# Patient Record
Sex: MALE | Race: AMERICAN INDIAN OR ALASKA NATIVE
[De-identification: names, ages, dates, MRNs, and addresses within clinical notes are randomized per-mention and may not be internally consistent; named-entity substitution may affect disease eponyms.]

---

## 2019-11-08 ENCOUNTER — HOSPITAL ENCOUNTER (INPATIENT)
Dept: HOSPITAL 5 - ED | Age: 44
LOS: 4 days | Discharge: HOME | DRG: 917 | End: 2019-11-12
Attending: INTERNAL MEDICINE | Admitting: INTERNAL MEDICINE
Payer: COMMERCIAL

## 2019-11-08 DIAGNOSIS — E87.2: ICD-10-CM

## 2019-11-08 DIAGNOSIS — T42.4X1A: Primary | ICD-10-CM

## 2019-11-08 DIAGNOSIS — R65.10: ICD-10-CM

## 2019-11-08 DIAGNOSIS — F19.20: ICD-10-CM

## 2019-11-08 DIAGNOSIS — J96.00: ICD-10-CM
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
## 2019-11-08 DIAGNOSIS — R74.0: ICD-10-CM

## 2019-11-08 DIAGNOSIS — G40.901: ICD-10-CM

## 2019-11-08 DIAGNOSIS — D72.829: ICD-10-CM

## 2019-11-08 LAB
ALBUMIN SERPL-MCNC: 4.3 G/DL (ref 3.9–5)
ALT SERPL-CCNC: 92 UNITS/L (ref 7–56)
BACTERIA #/AREA URNS HPF: (no result) /HPF
BASOPHILS # (AUTO): 0.1 K/MM3 (ref 0–0.1)
BASOPHILS NFR BLD AUTO: 0.5 % (ref 0–1.8)
BENZODIAZEPINES SCREEN,URINE: (no result)
BILIRUB UR QL STRIP: (no result)
BLOOD UR QL VISUAL: (no result)
BUN SERPL-MCNC: 16 MG/DL (ref 9–20)
BUN/CREAT SERPL: 12 %
CALCIUM SERPL-MCNC: 7.7 MG/DL (ref 8.4–10.2)
CALCIUM SERPL-MCNC: 8.2 MG/DL (ref 8.4–10.2)
EOSINOPHIL # BLD AUTO: 0 K/MM3 (ref 0–0.4)
EOSINOPHIL NFR BLD AUTO: 0.1 % (ref 0–4.3)
HCT VFR BLD CALC: 45.1 % (ref 35.5–45.6)
HEMOLYSIS INDEX: 48
HGB BLD-MCNC: 15.3 GM/DL (ref 11.8–15.2)
HYALINE CASTS #/AREA URNS LPF: 3 /LPF
LYMPHOCYTES # BLD AUTO: 1.1 K/MM3 (ref 1.2–5.4)
LYMPHOCYTES NFR BLD AUTO: 9 % (ref 13.4–35)
MCHC RBC AUTO-ENTMCNC: 34 % (ref 32–34)
MCV RBC AUTO: 89 FL (ref 84–94)
METHADONE SCREEN,URINE: (no result)
MONOCYTES # (AUTO): 1 K/MM3 (ref 0–0.8)
MONOCYTES % (AUTO): 8.1 % (ref 0–7.3)
MUCOUS THREADS #/AREA URNS HPF: (no result) /HPF
OPIATE SCREEN,URINE: (no result)
PH UR STRIP: 5 [PH] (ref 5–7)
PLATELET # BLD: 215 K/MM3 (ref 140–440)
RBC # BLD AUTO: 5.07 M/MM3 (ref 3.65–5.03)
RBC #/AREA URNS HPF: 1 /HPF (ref 0–6)
UROBILINOGEN UR-MCNC: < 2 MG/DL (ref ?–2)
WBC #/AREA URNS HPF: 4 /HPF (ref 0–6)

## 2019-11-08 PROCEDURE — 36600 WITHDRAWAL OF ARTERIAL BLOOD: CPT

## 2019-11-08 PROCEDURE — 71045 X-RAY EXAM CHEST 1 VIEW: CPT

## 2019-11-08 PROCEDURE — 82803 BLOOD GASES ANY COMBINATION: CPT

## 2019-11-08 PROCEDURE — 80074 ACUTE HEPATITIS PANEL: CPT

## 2019-11-08 PROCEDURE — 0BH17EZ INSERTION OF ENDOTRACHEAL AIRWAY INTO TRACHEA, VIA NATURAL OR ARTIFICIAL OPENING: ICD-10-PCS | Performed by: INTERNAL MEDICINE

## 2019-11-08 PROCEDURE — 85025 COMPLETE CBC W/AUTO DIFF WBC: CPT

## 2019-11-08 PROCEDURE — 80307 DRUG TEST PRSMV CHEM ANLYZR: CPT

## 2019-11-08 PROCEDURE — 36415 COLL VENOUS BLD VENIPUNCTURE: CPT

## 2019-11-08 PROCEDURE — 83735 ASSAY OF MAGNESIUM: CPT

## 2019-11-08 PROCEDURE — G0480 DRUG TEST DEF 1-7 CLASSES: HCPCS

## 2019-11-08 PROCEDURE — 82310 ASSAY OF CALCIUM: CPT

## 2019-11-08 PROCEDURE — 94002 VENT MGMT INPAT INIT DAY: CPT

## 2019-11-08 PROCEDURE — 80320 DRUG SCREEN QUANTALCOHOLS: CPT

## 2019-11-08 PROCEDURE — 74018 RADEX ABDOMEN 1 VIEW: CPT

## 2019-11-08 PROCEDURE — 82140 ASSAY OF AMMONIA: CPT

## 2019-11-08 PROCEDURE — 4A033R1 MEASUREMENT OF ARTERIAL SATURATION, PERIPHERAL, PERCUTANEOUS APPROACH: ICD-10-PCS | Performed by: INTERNAL MEDICINE

## 2019-11-08 PROCEDURE — 87205 SMEAR GRAM STAIN: CPT

## 2019-11-08 PROCEDURE — 93005 ELECTROCARDIOGRAM TRACING: CPT

## 2019-11-08 PROCEDURE — 81001 URINALYSIS AUTO W/SCOPE: CPT

## 2019-11-08 PROCEDURE — 80053 COMPREHEN METABOLIC PANEL: CPT

## 2019-11-08 PROCEDURE — 94003 VENT MGMT INPAT SUBQ DAY: CPT

## 2019-11-08 PROCEDURE — 94760 N-INVAS EAR/PLS OXIMETRY 1: CPT

## 2019-11-08 PROCEDURE — 31500 INSERT EMERGENCY AIRWAY: CPT

## 2019-11-08 PROCEDURE — 70450 CT HEAD/BRAIN W/O DYE: CPT

## 2019-11-08 PROCEDURE — 87070 CULTURE OTHR SPECIMN AEROBIC: CPT

## 2019-11-08 PROCEDURE — 94640 AIRWAY INHALATION TREATMENT: CPT

## 2019-11-08 PROCEDURE — 93010 ELECTROCARDIOGRAM REPORT: CPT

## 2019-11-08 PROCEDURE — 85027 COMPLETE CBC AUTOMATED: CPT

## 2019-11-08 PROCEDURE — 5A1945Z RESPIRATORY VENTILATION, 24-96 CONSECUTIVE HOURS: ICD-10-PCS | Performed by: INTERNAL MEDICINE

## 2019-11-08 PROCEDURE — 84100 ASSAY OF PHOSPHORUS: CPT

## 2019-11-08 RX ADMIN — PROPOFOL SCH MLS/HR: 10 INJECTION, EMULSION INTRAVENOUS at 23:30

## 2019-11-08 RX ADMIN — FAMOTIDINE SCH MG: 10 INJECTION, SOLUTION INTRAVENOUS at 23:39

## 2019-11-08 RX ADMIN — CEFTRIAXONE SODIUM SCH MLS/HR: 2 INJECTION, POWDER, FOR SOLUTION INTRAMUSCULAR; INTRAVENOUS at 23:40

## 2019-11-08 RX ADMIN — PROPOFOL SCH MLS/HR: 10 INJECTION, EMULSION INTRAVENOUS at 18:42

## 2019-11-08 RX ADMIN — ENOXAPARIN SODIUM SCH MG: 100 INJECTION SUBCUTANEOUS at 23:40

## 2019-11-08 RX ADMIN — Medication SCH ML: at 23:39

## 2019-11-08 NOTE — EMERGENCY DEPARTMENT REPORT
History of Present Illness





- General


Stated Complaint: OD/UNRESPONSIVE


Time Seen by Provider: 11/08/19 15:00


Source: EMS


Mode of arrival: Stretcher


Limitations: Altered Mental Status, Physical Limitation





- History of Present Illness


Initial Comments: 





Patient is a 42-year-old male that presents emergency room for overdose.  EMS 

states the patient has been unresponsive since the mother.  EMS states that the 

patient had a seizure just prior to arrival he was given 2 of Versed.  Prior to 

the seizure patient was given Narcan with no response.


MD Complaint: intentional overdose, accidental overdose


-: Sudden


Intent: other


Treatments Prior to Arrival: oxygen, narcan





- Related Data


                                  Previous Rx's











 Medication  Instructions  Recorded  Last Taken  Type


 


levETIRAcetam [Keppra TAB] 750 mg PO BID #60 tablet 11/12/19 Unknown Rx











                                    Allergies











Allergy/AdvReac Type Severity Reaction Status Date / Time


 


No Known Allergies Allergy   Unverified 11/08/19 17:15














ED Review of Systems


ROS: 


Stated complaint: OD/UNRESPONSIVE


Other details as noted in HPI





Comment: Unobtainable due to pts medical conditions





ED Past Medical Hx





- Past Medical History


Previous Medical History?: No





- Surgical History


Past Surgical History?: No





- Family History


Family history: no significant





- Social History


Smoking Status: Unknown if ever smoked


Substance Use Type: Cocaine





- Medications


Home Medications: 


                                Home Medications











 Medication  Instructions  Recorded  Confirmed  Last Taken  Type


 


levETIRAcetam [Keppra TAB] 750 mg PO BID #60 tablet 11/12/19  Unknown Rx














ED Physical Exam





- General


Limitations: Altered Mental Status, Physical Limitation


General appearance: obtunded





- Head


Head exam: Present: atraumatic, normocephalic





- Eye


Eye exam: Present: normal appearance, PERRL


Pupils: Present: normal accommodation





- ENT


ENT exam: Present: mucous membranes moist





- Neck


Neck exam: Present: normal inspection





- Respiratory


Respiratory exam: Present: normal lung sounds bilaterally.  Absent: respiratory 

distress, wheezes, rales





- Cardiovascular


Cardiovascular Exam: Present: regular rate, normal rhythm.  Absent: systolic 

murmur, diastolic murmur, rubs, gallop





- GI/Abdominal


GI/Abdominal exam: Present: soft, normal bowel sounds





- Rectal


Rectal exam: Present: deferred





- Extremities Exam


Extremities exam: Present: normal inspection





- Back Exam


Back exam: Present: normal inspection





- Neurological Exam


Neurological exam: Present: altered





- Expanded Neurological Exam


  ** Expanded


Best Eye Response ([place]): (1) no response


Best Motor Response ([place]): (1) no motor response


Best Verbal Response ([place]): (2) incomprehsible sounds


[place] Total: 4





- Skin


Skin exam: Present: warm, dry, intact, normal color.  Absent: rash





ED Course


                                   Vital Signs











  11/08/19 11/08/19 11/08/19





  15:11 15:16 15:28


 


Temperature   


 


Pulse Rate  93 H 92 H


 


Respiratory 15 11 L 





Rate   


 


Blood Pressure  95/53 95/53


 


Blood Pressure   





[Right]   


 


O2 Sat by Pulse 95 94 97





Oximetry   














  11/08/19 11/08/19 11/08/19





  15:30 15:46 16:00


 


Temperature 96.3 F L  


 


Pulse Rate 93 H 105 H 108 H


 


Respiratory 18 18 17





Rate   


 


Blood Pressure 95/53 95/53 138/81


 


Blood Pressure 122/71  





[Right]   


 


O2 Sat by Pulse 96 98 98





Oximetry   














  11/08/19 11/08/19 11/08/19





  16:16 16:30 16:45


 


Temperature   


 


Pulse Rate 106 H 99 H 99 H


 


Respiratory 20 21 21





Rate   


 


Blood Pressure 138/81 138/81 149/79


 


Blood Pressure   





[Right]   


 


O2 Sat by Pulse 98 98 90





Oximetry   














  11/08/19 11/08/19 11/08/19





  17:00 17:15 17:30


 


Temperature   


 


Pulse Rate 94 H 93 H 89


 


Respiratory 31 H 20 27 H





Rate   


 


Blood Pressure 128/81 128/71 133/68


 


Blood Pressure   





[Right]   


 


O2 Sat by Pulse 87 80 L 87





Oximetry   














  11/08/19 11/08/19 11/08/19





  17:46 18:00 18:03


 


Temperature   


 


Pulse Rate 88 82 88


 


Respiratory 24 26 H 





Rate   


 


Blood Pressure 110/61 103/58 103/58


 


Blood Pressure   





[Right]   


 


O2 Sat by Pulse 97  98





Oximetry   














  11/08/19 11/08/19 11/08/19





  18:16 18:30 18:45


 


Temperature   


 


Pulse Rate 91 H 92 H 84


 


Respiratory 36 H 20 23





Rate   


 


Blood Pressure 124/82 121/75 72/35


 


Blood Pressure   





[Right]   


 


O2 Sat by Pulse   





Oximetry   














  11/08/19 11/08/19 11/08/19





  19:12 19:16 19:30


 


Temperature   


 


Pulse Rate 77 77 89


 


Respiratory  29 H 34 H





Rate   


 


Blood Pressure 103/58 103/58 103/58


 


Blood Pressure   





[Right]   


 


O2 Sat by Pulse 94 88 92





Oximetry   














  11/08/19 11/08/19 11/08/19





  19:45 20:00 20:15


 


Temperature   


 


Pulse Rate 77 84 76


 


Respiratory 24 28 H 22





Rate   


 


Blood Pressure 95/52 72/35 96/57


 


Blood Pressure   





[Right]   


 


O2 Sat by Pulse 96 98 96





Oximetry   














  11/08/19 11/08/19 11/08/19





  20:30 20:46 21:30


 


Temperature   


 


Pulse Rate 78 89 96 H


 


Respiratory 19 27 H 25 H





Rate   


 


Blood Pressure 99/49 99/49 99/53


 


Blood Pressure   





[Right]   


 


O2 Sat by Pulse 99 98 98





Oximetry   














  11/08/19 11/08/19 11/08/19





  21:45 22:00 22:15


 


Temperature   


 


Pulse Rate 87 80 86


 


Respiratory 31 H 29 H 24





Rate   


 


Blood Pressure 105/50 92/53 92/62


 


Blood Pressure   





[Right]   


 


O2 Sat by Pulse 96 96 95





Oximetry   














  11/08/19 11/08/19 11/08/19





  22:30 22:45 23:00


 


Temperature   


 


Pulse Rate 77 82 86


 


Respiratory 24 24 26 H





Rate   


 


Blood Pressure 92/62 95/53 97/61


 


Blood Pressure   





[Right]   


 


O2 Sat by Pulse 96 95 97





Oximetry   














  11/08/19 11/08/19 11/08/19





  23:14 23:15 23:30


 


Temperature   


 


Pulse Rate 83 84 83


 


Respiratory 26 H 22 20





Rate   


 


Blood Pressure 97/61 109/63 92/57


 


Blood Pressure   





[Right]   


 


O2 Sat by Pulse 98 96 98





Oximetry   














  11/08/19 11/09/19 11/09/19





  23:45 00:00 00:31


 


Temperature   


 


Pulse Rate 90 83 90


 


Respiratory 19 21 25 H





Rate   


 


Blood Pressure 105/64 96/52 98/55


 


Blood Pressure   





[Right]   


 


O2 Sat by Pulse 96 96 97





Oximetry   














  11/09/19 11/09/19 11/09/19





  00:45 01:00 01:01


 


Temperature   


 


Pulse Rate 87 89 89


 


Respiratory 23 24 





Rate   


 


Blood Pressure 101/61 108/77 108/61


 


Blood Pressure   





[Right]   


 


O2 Sat by Pulse  95 95





Oximetry   














  11/09/19 11/09/19 11/09/19





  01:15 01:30 01:45


 


Temperature   


 


Pulse Rate 87 81 78


 


Respiratory 23 22 21





Rate   


 


Blood Pressure 95/53 95/52 92/53


 


Blood Pressure   





[Right]   


 


O2 Sat by Pulse 94  92





Oximetry   














  11/09/19 11/09/19 11/09/19





  02:00 02:15 02:30


 


Temperature   


 


Pulse Rate 78 79 77


 


Respiratory 19 19 20





Rate   


 


Blood Pressure 98/52 99/55 97/54


 


Blood Pressure   





[Right]   


 


O2 Sat by Pulse 93 93 94





Oximetry   














  11/09/19 11/09/19 11/09/19





  02:45 03:00 03:15


 


Temperature   


 


Pulse Rate 73 76 74


 


Respiratory 21 21 16





Rate   


 


Blood Pressure 94/50 98/53 100/56


 


Blood Pressure   





[Right]   


 


O2 Sat by Pulse 95  





Oximetry   














  11/09/19 11/09/19 11/09/19





  03:30 03:45 04:00


 


Temperature   


 


Pulse Rate 77 74 75


 


Respiratory 19 20 18





Rate   


 


Blood Pressure 100/53 95/52 96/53


 


Blood Pressure   





[Right]   


 


O2 Sat by Pulse  95 





Oximetry   














  11/09/19 11/09/19 11/09/19





  04:15 04:21 04:30


 


Temperature   


 


Pulse Rate 73 73 70


 


Respiratory 18  21





Rate   


 


Blood Pressure 92/52 92/52 97/61


 


Blood Pressure   





[Right]   


 


O2 Sat by Pulse  96 98





Oximetry   














  11/09/19 11/09/19 11/09/19





  04:45 05:00 05:15


 


Temperature   


 


Pulse Rate 71 76 78


 


Respiratory 19 20 14





Rate   


 


Blood Pressure 94/52 96/52 95/53


 


Blood Pressure   





[Right]   


 


O2 Sat by Pulse  97 97





Oximetry   














  11/09/19 11/09/19 11/09/19





  05:30 05:45 06:00


 


Temperature   


 


Pulse Rate 68 73 74


 


Respiratory 17 19 20





Rate   


 


Blood Pressure 92/54 94/49 90/45


 


Blood Pressure   





[Right]   


 


O2 Sat by Pulse   96





Oximetry   














  11/09/19 11/09/19 11/09/19





  06:15 06:45 07:00


 


Temperature   


 


Pulse Rate 71 72 70


 


Respiratory 19 21 17





Rate   


 


Blood Pressure 99/46 92/51 94/51


 


Blood Pressure   





[Right]   


 


O2 Sat by Pulse 95 96 





Oximetry   














  11/09/19 11/09/19 11/09/19





  07:10 07:15 07:28


 


Temperature 98.4 F  


 


Pulse Rate 72 69 73


 


Respiratory 20 16 





Rate   


 


Blood Pressure  94/52 94/52


 


Blood Pressure 94/55  





[Right]   


 


O2 Sat by Pulse 97 97 100





Oximetry   














  11/09/19 11/09/19 11/09/19





  07:30 07:45 08:00


 


Temperature   


 


Pulse Rate 75 73 73


 


Respiratory 15 20 17





Rate   


 


Blood Pressure 105/62 96/53 94/52


 


Blood Pressure   





[Right]   


 


O2 Sat by Pulse 100 98 





Oximetry   














  11/09/19 11/09/19 11/09/19





  08:15 08:30 08:45


 


Temperature   


 


Pulse Rate 74 71 69


 


Respiratory 20 20 20





Rate   


 


Blood Pressure 94/55 90/49 92/48


 


Blood Pressure   





[Right]   


 


O2 Sat by Pulse   97





Oximetry   














  11/09/19





  10:00


 


Temperature 


 


Pulse Rate 87


 


Respiratory 26 H





Rate 


 


Blood Pressure 


 


Blood Pressure 





[Right] 


 


O2 Sat by Pulse 97





Oximetry 














- Reevaluation(s)


Reevaluation #1: 


initial evaluation done.  Patient's GCS is less than 8. we will intubate patient

 to protect his airway.  See procedure note.


11/08/19 15:13





Reevaluation #2: 


Patient resting comfortably in bed and on ventilator.  Nurse to place Linda


11/08/19 16:09





Reevaluation #3: 


Mother at bedside.  And the mother states that the patient has multiple 

overdoses and has a long history of drug abuse.


11/08/19 17:19








- Consultations


Consultation #1: 


Hospitalist consult for admission.  Hospitalist admit patient.


11/08/19 18:39








- Intubation


Time Out Performed: Yes


Sedative: Etomidate


Paralytic: Rocuronium


Laryngoscope: fiberoptic video scope


Size: 4


Assist Device Used: fiberoptic device


ET Tube Size: 7.5


Tube Secured Depth (cm): 22


Tube Secured Location: teeth


Tube Placement Confirmation: visualized tube passing t, equal breath sounds 

bilat, no breath sounds over epi, confirmation by capnometr


Patient Tolerated Procedure: well, no complications


Intubation Complications: none





ED Medical Decision Making





- Lab Data


Result diagrams: 


                                 11/12/19 06:28





                                 11/12/19 06:28





- EKG Data


-: EKG Interpreted by Me


EKG shows normal: sinus rhythm, axis, intervals, QRS complexes, ST-T waves


Rate: normal





- Radiology Data


Radiology results: image reviewed


interpreted by me: 





ET tube in good placement.  No acute findings on chest x-ray..








 CHEST 1 VIEW 11/8/2019 3:26 PM 





INDICATION / CLINICAL INFORMATION: 


od. ams. intubation. 





COMPARISON: 


None available. 





FINDINGS: 





SUPPORT DEVICES: ET tube has tip 8 cm above [place]o. 





HEART / MEDIASTINUM: No significant abnormality. 





LUNGS / PLEURA: Elevation of left hemidiaphragm with volume loss in left 

hemithorax. No focal 


 infiltrate or pleural effusion. No pneumothorax. 





ADDITIONAL FINDINGS: No significant additional findings. 





IMPRESSION: 


1. Volume loss of left lung with elevated left hemidiaphragm. No acute disease. 

















 CT BRAIN: 11/8/2019 





 INDICATION / CLINICAL INFORMATION: 


 ams. 





 COMPARISON: 


 None available. 





 FINDINGS: 





 BRAIN/INTRACRANIAL STRUCTURES: Unenhanced CT images of the brain demonstrate no

 evidence of 


 intracranial abnormality. 





 Ventricles and sulci are normal in size and shape. 





 There is no evidence of ischemic injury, hemorrhage, or mass. There are no 

abnormal extra-axial 


 fluid collections. 














 EXTRACRANIAL STRUCTURES: Unremarkable. 











 Although only seen on the lateral  view of the skull, it appears that the 

nasogastric tube 


 may be coiled in the nasal cavity and possibly in the posterior oropharynx. 











 IMPRESSION: 


 Negative unenhanced MRI of the brain. 





 Possible coiling of nasogastric tube in nasopharynx and nasal cavity. 




















- Medical Decision Making





Patient is a 42-year-old male that presents emergency room with altered mental 

status and unresponsiveness.  Patient was immediately intubated to protect his 

airway due to his lack of responsiveness.  Patient found to have a metabolic 

acidosis.  Patient has a long history of drug abuse and overdose.  Patient's UDS

 is positive.  Patient's head CT negative.  Patient's labs essentially 

unremarkable except for his acidosis.





- Differential Diagnosis


unresponsiveness.  Overdose. AMS. 


Critical Care Time: Yes


Critical care time in (mins) excluding proc time.: 65


Critical care attestation.: 


If time is entered above; I have spent that time in minutes in the direct care 

of this critically ill patient, excluding procedure time.





Critical Care Time: 





65 minutes





ED Disposition


Clinical Impression: 


 Seizure, Unresponsive episode, Metabolic acidosis





Overdose


Qualifiers:


 Encounter type: initial encounter Injury intent: undetermined intent Qualified 

Code(s): T50.904A - Poisoning by unspecified drugs, medicaments and biological 

substances, undetermined, initial encounter





Altered mental state


Qualifiers:


 Altered mental status type: unspecified Qualified Code(s): R41.82 - Altered 

mental status, unspecified





Disposition: DC-09 OP ADMIT IP TO THIS HOSP


Is pt being admited?: Yes


Does the pt Need Aspirin: No


Condition: Critical


Time of Disposition: 18:42

## 2019-11-08 NOTE — XRAY REPORT
CHEST 1 VIEW 11/8/2019 3:26 PM



INDICATION / CLINICAL INFORMATION:

od. ams. intubation.



COMPARISON: 

None available.



FINDINGS:



SUPPORT DEVICES: ET tube has tip 8 cm above [place].



HEART / MEDIASTINUM: No significant abnormality. 



LUNGS / PLEURA: Elevation of left hemidiaphragm with volume loss in left hemithorax. No focal infiltr
ate or pleural effusion. No pneumothorax. 



ADDITIONAL FINDINGS: No significant additional findings.



IMPRESSION:

1. Volume loss of left lung with elevated left hemidiaphragm. No acute disease.



Signer Name: Krishan Moss MD 

Signed: 11/8/2019 3:51 PM

 Workstation Name: [id number]

## 2019-11-08 NOTE — CAT SCAN REPORT
CT BRAIN: 11/8/2019



INDICATION / CLINICAL INFORMATION:

ams.



COMPARISON: 

None available.



FINDINGS:



BRAIN/INTRACRANIAL STRUCTURES: Unenhanced CT images of the brain demonstrate no evidence of intracran
ial abnormality.



Ventricles and sulci are normal in size and shape.



There is no evidence of ischemic injury, hemorrhage, or mass. There are no abnormal extra-axial fluid
 collections.









EXTRACRANIAL STRUCTURES: Unremarkable.







 Although only seen on the lateral  view of the skull, it appears that the nasogastric tube may 
be coiled in the nasal cavity and possibly in the posterior oropharynx.



 



IMPRESSION:

 Negative unenhanced MRI of the brain.



Possible coiling of nasogastric tube in nasopharynx and nasal cavity.









All CT scans at this location are performed using dose reduction to ALARA by means of automated expos
ure control.



Signer Name: Navneet Patel MD 

Signed: 11/8/2019 7:42 PM

 Workstation Name: [id number]

## 2019-11-08 NOTE — HISTORY AND PHYSICAL REPORT
History of Present Illness


Date of examination: 11/08/19


Date of admission: 


11/08/2019


Chief complaint: 


Decreased responsiveness-unknown time





History of present illness: 


42-year-old  male with no significant past medical history except for 

PCP dependence and benzo dependence was found unresponsive by his mother in the 

morning.  Mother cannot tell how long he has passed out.  Last well-known time 

was last night.  Mother called EMS and [facility] patient had a tonic-clonic 

seizure just before arrival in the emergency room and was given 2 milligrams of 

Versed.  Patient's UDS positive for PCP and benzos.  No fever.  Patient was 

intubated in the emergency room.  Head CT was negative








Past Medical History


Previous Medical History?: No





Surgical History


Past Surgical History?: No





Family History


Family history: no significant PCP and benzo dependence 





Social History


PCP and benzo dependence











 Review of Systems 


ROS: 


Stated complaint: OD/UNRESPONSIVE


Other details as noted in HPI


Comment: Unobtainable due to pts medical conditions














Past History


Past Medical History: No medical history


Past Surgical History: No surgical history


Social history: lives with family, other (PCP abuse and benzo abuse)


Family history: no significant family history





Medications and Allergies


                                    Allergies











Allergy/AdvReac Type Severity Reaction Status Date / Time


 


No Known Allergies Allergy   Unverified 11/08/19 17:15











Active Meds: 


Active Medications





Lorazepam 100 mg/ Sodium Chloride/ Miscellaneous Information  100 mls @ 1 mls/hr

IV TITR CAM; Protocol


   Last Titration: 11/08/19 17:52 Dose:  5 mg/hr, 5 mls/hr


   Documented by: 


Propofol (Diprivan 10 Mg/Ml)  1,000 mg in 100 mls @ 2.245 mls/hr IV TITR CAM; 

Protocol


   Last Titration: 11/08/19 19:34 Dose:  10 mcg/kg/min, 4.491 mls/hr


   Documented by: 











Exam





- Constitutional


Vitals: 


                                        











Temp Pulse Resp BP Pulse Ox


 


 96.3 F L  77   29 H  103/58   88 


 


 11/08/19 15:30  11/08/19 19:16  11/08/19 19:16  11/08/19 19:16  11/08/19 19:16











General appearance: Present: no acute distress, well-nourished





- EENT


Eyes: Present: PERRL


ENT: hearing intact, clear oral mucosa





- Neck


Neck: Present: supple, normal ROM





- Respiratory


Respiratory effort: normal


Respiratory: bilateral: CTA





- Cardiovascular


Heart rate: 91


Rhythm: regular


Heart Sounds: Present: S1 & S2.  Absent: rub, click





- Extremities


Extremities: no ischemia, pulses intact, pulses symmetrical, No edema


Peripheral Pulses: within normal limits





- Abdominal


General gastrointestinal: Present: soft, non-tender, non-distended, normal bowel

sounds


Male genitourinary: Present: normal





- Rectal


Rectal Exam: deferred





- Integumentary


Integumentary: Present: clear, warm, dry





- Musculoskeletal


Musculoskeletal: generalized weakness





- Psychiatric


Psychiatric: other





- Neurologic


Neurologic: other (unresponsive, intubated on full ventilator  support)





- Allied Health


Allied health notes reviewed: nursing, case management





Results





- Labs


CBC & Chem 7: 


                                 11/08/19 15:30





                                 11/08/19 17:00


Labs: 


                             Laboratory Last Values











WBC  12.4 K/mm3 (4.5-11.0)  H  11/08/19  15:30    


 


RBC  5.07 M/mm3 (3.65-5.03)  H  11/08/19  15:30    


 


Hgb  15.3 gm/dl (11.8-15.2)  H  11/08/19  15:30    


 


Hct  45.1 % (35.5-45.6)   11/08/19  15:30    


 


MCV  89 fl (84-94)   11/08/19  15:30    


 


MCH  30 pg (28-32)   11/08/19  15:30    


 


MCHC  34 % (32-34)   11/08/19  15:30    


 


RDW  13.1 % (13.2-15.2)  L  11/08/19  15:30    


 


Plt Count  215 K/mm3 (140-440)   11/08/19  15:30    


 


Lymph % (Auto)  9.0 % (13.4-35.0)  L  11/08/19  15:30    


 


Mono % (Auto)  8.1 % (0.0-7.3)  H  11/08/19  15:30    


 


Eos % (Auto)  0.1 % (0.0-4.3)   11/08/19  15:30    


 


Baso % (Auto)  0.5 % (0.0-1.8)   11/08/19  15:30    


 


Lymph #  1.1 K/mm3 (1.2-5.4)  L  11/08/19  15:30    


 


Mono #  1.0 K/mm3 (0.0-0.8)  H  11/08/19  15:30    


 


Eos #  0.0 K/mm3 (0.0-0.4)   11/08/19  15:30    


 


Baso #  0.1 K/mm3 (0.0-0.1)   11/08/19  15:30    


 


Seg Neutrophils %  82.3 % (40.0-70.0)  H  11/08/19  15:30    


 


Seg Neutrophils #  10.2 K/mm3 (1.8-7.7)  H  11/08/19  15:30    


 


POC ABG pH  7.265  (7.35-7.45)  L  11/08/19  18:03    


 


POC ABG pCO2  45.0  (35-45)   11/08/19  18:03    


 


POC ABG pO2  212  ()  H  11/08/19  18:03    


 


POC ABG HCO3  20.4  (22-26 mml/L)   11/08/19  18:03    


 


POC ABG Total CO2  22  (23-27mmol/L)   11/08/19  18:03    


 


POC ABG O2 Sat  100   11/08/19  18:03    


 


POC ABG Base Excess  -7  ((-2) - (+3)mmol/L)   11/08/19  18:03    


 


FiO2  100 %  11/08/19  18:03    


 


Sodium  144 mmol/L (137-145)   11/08/19  17:00    


 


Potassium  4.0 mmol/L (3.6-5.0)   11/08/19  17:00    


 


Chloride  110.0 mmol/L ()  H  11/08/19  17:00    


 


Carbon Dioxide  17 mmol/L (22-30)  L  11/08/19  17:00    


 


Anion Gap  21 mmol/L  11/08/19  17:00    


 


BUN  16 mg/dL (9-20)   11/08/19  17:00    


 


Creatinine  1.3 mg/dL (0.8-1.5)   11/08/19  17:00    


 


Estimated GFR  > 60 ml/min  11/08/19  17:00    


 


BUN/Creatinine Ratio  12 %  11/08/19  17:00    


 


Glucose  111 mg/dL ()  H  11/08/19  17:00    


 


Lactic Acid  0.70 mmol/L (0.7-2.0)   11/08/19  17:00    


 


Calcium  8.2 mg/dL (8.4-10.2)  L  11/08/19  17:00    


 


Total Bilirubin  0.70 mg/dL (0.1-1.2)   11/08/19  17:00    


 


AST  367 units/L (5-40)  H  11/08/19  17:00    


 


ALT  92 units/L (7-56)  H  11/08/19  17:00    


 


Alkaline Phosphatase  55 units/L ()   11/08/19  17:00    


 


Total Protein  6.7 g/dL (6.3-8.2)   11/08/19  17:00    


 


Albumin  4.3 g/dL (3.9-5)   11/08/19  17:00    


 


Albumin/Globulin Ratio  1.8 %  11/08/19  17:00    


 


Urine Color  Yellow  (Yellow)   11/08/19  16:30    


 


Urine Turbidity  Slightly-cloudy  (Clear)   11/08/19  16:30    


 


Urine pH  5.0  (5.0-7.0)   11/08/19  16:30    


 


Ur Specific Gravity  1.017  (1.003-1.030)   11/08/19  16:30    


 


Urine Protein  100 mg/dl mg/dL (Negative)   11/08/19  16:30    


 


Urine Glucose (UA)  Neg mg/dL (Negative)   11/08/19  16:30    


 


Urine Ketones  Tr mg/dL (Negative)   11/08/19  16:30    


 


Urine Blood  Lg  (Negative)   11/08/19  16:30    


 


Urine Nitrite  Neg  (Negative)   11/08/19  16:30    


 


Urine Bilirubin  Neg  (Negative)   11/08/19  16:30    


 


Urine Urobilinogen  < 2.0 mg/dL (<2.0)   11/08/19  16:30    


 


Ur Leukocyte Esterase  Neg  (Negative)   11/08/19  16:30    


 


Urine WBC (Auto)  4.0 /HPF (0.0-6.0)   11/08/19  16:30    


 


Urine RBC (Auto)  1.0 /HPF (0.0-6.0)   11/08/19  16:30    


 


U Epithel Cells (Auto)  1.0 /HPF (0-13.0)   11/08/19  16:30    


 


Urine Bacteria (Auto)  2+ /HPF (Negative)   11/08/19  16:30    


 


Hyaline Casts  3 /LPF  11/08/19  16:30    


 


Urine Mucus  Few /HPF  11/08/19  16:30    


 


Urine Opiates Screen  Presumptive negative   11/08/19  16:30    


 


Urine Methadone Screen  Presumptive negative   11/08/19  16:30    


 


Ur Barbiturates Screen  Presumptive negative   11/08/19  16:30    


 


Ur Phencyclidine Scrn  Presumptive positive   11/08/19  16:30    


 


Ur Amphetamines Screen  Presumptive negative   11/08/19  16:30    


 


U Benzodiazepines Scrn  Presumptive positive   11/08/19  16:30    


 


Urine Cocaine Screen  Presumptive negative   11/08/19  16:30    


 


U Marijuana (THC) Screen  Presumptive negative   11/08/19  16:30    


 


Drugs of Abuse Note  Disclamer   11/08/19  16:30    


 


Plasma/Serum Alcohol  < 0.01 % (0-0.07)   11/08/19  18:34    








                                    Short CBC











  11/08/19 Range/Units





  15:30 


 


WBC  12.4 H  (4.5-11.0)  K/mm3


 


Hgb  15.3 H  (11.8-15.2)  gm/dl


 


Hct  45.1  (35.5-45.6)  %


 


Plt Count  215  (140-440)  K/mm3








                                       BMP











  11/08/19





  17:00


 


Sodium  144


 


Potassium  4.0


 


Chloride  110.0 H


 


Carbon Dioxide  17 L


 


BUN  16


 


Creatinine  1.3


 


Glucose  111 H


 


Calcium  8.2 L








                                 Liver Function











  11/08/19 Range/Units





  17:00 


 


Total Bilirubin  0.70  (0.1-1.2)  mg/dL


 


AST  367 H  (5-40)  units/L


 


ALT  92 H  (7-56)  units/L


 


Alkaline Phosphatase  55  ()  units/L


 


Albumin  4.3  (3.9-5)  g/dL








                                      Urine











  11/08/19 Range/Units





  16:30 


 


Urine Color  Yellow  (Yellow)  


 


Urine pH  5.0  (5.0-7.0)  


 


Ur Specific Gravity  1.017  (1.003-1.030)  


 


Urine Protein  100 mg/dl  (Negative)  mg/dL


 


Urine Glucose (UA)  Neg  (Negative)  mg/dL














- Imaging and Cardiology


EKG: report reviewed


Chest x-ray: report reviewed


CT Scan - head: report reviewed (no acute findings)


Imaging and Cardiology: 


Chest x-ray


IMPRESSION:


1. Volume loss of left lung with elevated left hemidiaphragm. No acute disease.











Assessment and Plan


Assessment and plan: 


Critical care time--40 minutes





Advance Directives: Yes (full code)


VTE prophylaxis?: Chemical


Plan of care discussed with patient/family: Yes





- Patient Problems


(1) Acute encephalopathy


Status: Acute   


Plan to address problem: 


Secondary to PCP and benzos


Pupils reactive


Ventilatory support


IV fluids


CIWA protocol for agitation and withdrawal symptoms








(2) PCP dependence


Status: Acute   


Plan to address problem: 


Supportive care


CIWA protocol


IV fluids








(3) Metabolic acidosis


Status: Acute   


Plan to address problem: 


IV normal saline for now








(4) Seizure


Status: Acute   


Plan to address problem: 


New-onset seizures


Patient initiated on IV Keppra


Patient may need oral Keppra for discharge


Patient to be bridged to oral Keppra once extubated








(5) Transaminitis


Status: Acute   


Plan to address problem: 


Acute hepatitis profile ordered


Trend liver function tests


IV fluids








(6) DVT prophylaxis


Status: Acute   


Plan to address problem: 


On Lovenox and GI prophylaxis

## 2019-11-09 LAB
HCO3 BLDA-SCNC: 17.4 MMOL/L (ref 20–26)
PCO2 BLDA: 36.5 MM HG
PH BLDA: 7.3 PH UNITS (ref 7.35–7.45)
PO2 BLDA: 89.8 MM HG (ref 80–90)

## 2019-11-09 RX ADMIN — ENOXAPARIN SODIUM SCH MG: 100 INJECTION SUBCUTANEOUS at 22:00

## 2019-11-09 RX ADMIN — LEVETIRACETAM SCH MLS/HR: 100 INJECTION, SOLUTION INTRAVENOUS at 18:35

## 2019-11-09 RX ADMIN — FAMOTIDINE SCH MG: 10 INJECTION, SOLUTION INTRAVENOUS at 22:00

## 2019-11-09 RX ADMIN — FAMOTIDINE SCH MG: 10 INJECTION, SOLUTION INTRAVENOUS at 11:44

## 2019-11-09 RX ADMIN — CEFTRIAXONE SODIUM SCH MLS/HR: 2 INJECTION, POWDER, FOR SOLUTION INTRAMUSCULAR; INTRAVENOUS at 22:00

## 2019-11-09 RX ADMIN — Medication SCH ML: at 22:00

## 2019-11-09 RX ADMIN — Medication SCH ML: at 11:44

## 2019-11-09 RX ADMIN — DEXTROSE AND SODIUM CHLORIDE SCH MLS/HR: 5; .9 INJECTION, SOLUTION INTRAVENOUS at 08:08

## 2019-11-09 RX ADMIN — DEXTROSE AND SODIUM CHLORIDE SCH MLS/HR: 5; .9 INJECTION, SOLUTION INTRAVENOUS at 18:35

## 2019-11-09 RX ADMIN — LEVETIRACETAM SCH MLS/HR: 100 INJECTION, SOLUTION INTRAVENOUS at 08:24

## 2019-11-09 NOTE — PROGRESS NOTE
Assessment and Plan


Assessment and plan: 


Patient is a 41 yo  man without known chronic medical problems except 

for PCP and Benzo dependence who presents to [facility] with AMS. He was found 

unresponsive by Mother.  Mother cannot tell how long he was passed out.  Last 

well-known time was last night. Mother called EMS and per EMS patient had a 

tonic-clonic seizure just before arrival in the emergency room and was given 2 

milligrams of Versed.  Patient's UDS positive for PCP and benzos.  Patient was 

intubated in the emergency room.  





* pCXR Impression: Volume loss of left lung with elevated left hemidiaphragm, no

  acute disease, ET tube has tip 8 cm above augusto


* CT head without contrast Impression: Negative 





(1) Acute toxic metabolic encephalopathy


Status: Acute   


Plan to address problem: 


Secondary to PCP and benzos


Pupils reactive


Ventilatory support


IV fluids


CIWA protocol for agitation and withdrawal symptoms





(2) PCP dependence


Status: Acute   


Plan to address problem: 


Supportive care


CIWA protocol


IV fluids





(3) Metabolic acidosis


Status: Acute   


Plan to address problem: 


IV normal saline for now





(4) Seizure


Status: Acute   


Plan to address problem: 


New-onset seizures


Patient initiated on IV Keppra


Patient may need oral Keppra for discharge


Patient to be bridged to oral Keppra once extubated





(5) Transaminitis


Status: Acute   


Plan to address problem: 


Acute hepatitis profile ordered


Trend liver function tests


IV fluids





(6) DVT prophylaxis


Status: Acute   


Plan to address problem: 


On Lovenox and GI prophylaxis








History


Interval history: 


Patient was seen and examined. Follow-up on current diagnosis of Respiratory 

failure. No overnight events reported to me. Patient intubated. Imaging, nursing

note, chart, labs and old chart reviewed. I spoke with mother and his Uncle Donnie

at bedside. 





Hospitalist Physical





- Physical exam


Narrative exam: 


Gen: WDWN, critically ill on life support/MV


HEENT: NCAT, EOMI, Pupils pinpoint, OP Clear 


Neck: supple, no adenopathy, no thyromegaly, no JVD 


CVS/Heart: RRR, normal S1S2, pulses present bilaterally 


Chest/Lungs: CTA B, Symmetrical chest expansion, good air entry bilaterally 


GI/Abdomen: soft, NTND, good bowel sounds, no guarding or rebound 


/Bladder: no suprapubic tenderness, no CVA or paraspinal tenderness 


Extermity/Skin: no c/c/e,


MSK: sedated


Neuro: sedated


Psych: sedated








- Constitutional


Vitals: 


                                        











Temp Pulse Resp BP Pulse Ox


 


 96.3 F L  72   21   92/51   96 


 


 11/08/19 15:30  11/09/19 06:45  11/09/19 06:45  11/09/19 06:45  11/09/19 06:45











General appearance: Present: no acute distress, well-nourished





Results





- Labs


CBC & Chem 7: 


                                 11/08/19 15:30





                                 11/08/19 17:00


Labs: 


                             Laboratory Last Values











WBC  12.4 K/mm3 (4.5-11.0)  H  11/08/19  15:30    


 


RBC  5.07 M/mm3 (3.65-5.03)  H  11/08/19  15:30    


 


Hgb  15.3 gm/dl (11.8-15.2)  H  11/08/19  15:30    


 


Hct  45.1 % (35.5-45.6)   11/08/19  15:30    


 


MCV  89 fl (84-94)   11/08/19  15:30    


 


MCH  30 pg (28-32)   11/08/19  15:30    


 


MCHC  34 % (32-34)   11/08/19  15:30    


 


RDW  13.1 % (13.2-15.2)  L  11/08/19  15:30    


 


Plt Count  215 K/mm3 (140-440)   11/08/19  15:30    


 


Lymph % (Auto)  9.0 % (13.4-35.0)  L  11/08/19  15:30    


 


Mono % (Auto)  8.1 % (0.0-7.3)  H  11/08/19  15:30    


 


Eos % (Auto)  0.1 % (0.0-4.3)   11/08/19  15:30    


 


Baso % (Auto)  0.5 % (0.0-1.8)   11/08/19  15:30    


 


Lymph #  1.1 K/mm3 (1.2-5.4)  L  11/08/19  15:30    


 


Mono #  1.0 K/mm3 (0.0-0.8)  H  11/08/19  15:30    


 


Eos #  0.0 K/mm3 (0.0-0.4)   11/08/19  15:30    


 


Baso #  0.1 K/mm3 (0.0-0.1)   11/08/19  15:30    


 


Seg Neutrophils %  82.3 % (40.0-70.0)  H  11/08/19  15:30    


 


Seg Neutrophils #  10.2 K/mm3 (1.8-7.7)  H  11/08/19  15:30    


 


POC ABG pH  7.300  (7.35-7.45)  L  11/08/19  22:12    


 


ABG pH  7.296 pH Units (7.350-7.450)  L  11/09/19  04:40    


 


POC ABG pCO2  36.6  (35-45)   11/08/19  22:12    


 


ABG pCO2  36.5 mm Hg  11/09/19  04:40    


 


POC ABG pO2  126  ()  H  11/08/19  22:12    


 


ABG pO2  89.8 mm Hg (80.0-90.0)   11/09/19  04:40    


 


POC ABG HCO3  18.0  (22-26 mml/L)   11/08/19  22:12    


 


ABG HCO3  17.4 mmol/L (20.0-26.0)  L  11/09/19  04:40    


 


POC ABG Total CO2  19  (23-27mmol/L)   11/08/19  22:12    


 


POC ABG O2 Sat  98   11/08/19  22:12    


 


ABG O2 Saturation  96.8 % (95.0-99.0)   11/09/19  04:40    


 


ABG O2 Content  18.2  (0.0-44)   11/09/19  04:40    


 


POC ABG Base Excess  -8  ((-2) - (+3)mmol/L)   11/08/19  22:12    


 


ABG Base Excess  -8.3 mmol/L (-2.0-3.0)  L  11/09/19  04:40    


 


ABG Hemoglobin  13.6 gm/dl (14.0-18.0)  L  11/09/19  04:40    


 


ABG Carboxyhemoglobin  1.4 % (0.0-5.0)   11/09/19  04:40    


 


ABG Methemoglobin  0.4 % (0.0-1.5)   11/09/19  04:40    


 


Oxyhemoglobin  95.1 % (95.0-99.0)   11/09/19  04:40    


 


FiO2  25 %  11/09/19  04:40    


 


Sodium  144 mmol/L (137-145)   11/08/19  17:00    


 


Potassium  4.0 mmol/L (3.6-5.0)   11/08/19  17:00    


 


Chloride  110.0 mmol/L ()  H  11/08/19  17:00    


 


Carbon Dioxide  17 mmol/L (22-30)  L  11/08/19  17:00    


 


Anion Gap  21 mmol/L  11/08/19  17:00    


 


BUN  16 mg/dL (9-20)   11/08/19  17:00    


 


Creatinine  1.3 mg/dL (0.8-1.5)   11/08/19  17:00    


 


Estimated GFR  > 60 ml/min  11/08/19  17:00    


 


BUN/Creatinine Ratio  12 %  11/08/19  17:00    


 


Glucose  111 mg/dL ()  H  11/08/19  17:00    


 


Lactic Acid  0.70 mmol/L (0.7-2.0)   11/08/19  17:00    


 


Calcium  7.7 mg/dL (8.4-10.2)  L  11/08/19  20:44    


 


Phosphorus  4.70 mg/dL (2.5-4.5)  H  11/08/19  20:44    


 


Magnesium  2.30 mg/dL (1.7-2.3)   11/08/19  20:44    


 


Total Bilirubin  0.70 mg/dL (0.1-1.2)   11/08/19  17:00    


 


AST  367 units/L (5-40)  H  11/08/19  17:00    


 


ALT  92 units/L (7-56)  H  11/08/19  17:00    


 


Alkaline Phosphatase  55 units/L ()   11/08/19  17:00    


 


Ammonia  55.0 umol/L (25-60)   11/08/19  20:44    


 


Total Protein  6.7 g/dL (6.3-8.2)   11/08/19  17:00    


 


Albumin  4.3 g/dL (3.9-5)   11/08/19  17:00    


 


Albumin/Globulin Ratio  1.8 %  11/08/19  17:00    


 


Urine Color  Yellow  (Yellow)   11/08/19  16:30    


 


Urine Turbidity  Slightly-cloudy  (Clear)   11/08/19  16:30    


 


Urine pH  5.0  (5.0-7.0)   11/08/19  16:30    


 


Ur Specific Gravity  1.017  (1.003-1.030)   11/08/19  16:30    


 


Urine Protein  100 mg/dl mg/dL (Negative)   11/08/19  16:30    


 


Urine Glucose (UA)  Neg mg/dL (Negative)   11/08/19  16:30    


 


Urine Ketones  Tr mg/dL (Negative)   11/08/19  16:30    


 


Urine Blood  Lg  (Negative)   11/08/19  16:30    


 


Urine Nitrite  Neg  (Negative)   11/08/19  16:30    


 


Urine Bilirubin  Neg  (Negative)   11/08/19  16:30    


 


Urine Urobilinogen  < 2.0 mg/dL (<2.0)   11/08/19  16:30    


 


Ur Leukocyte Esterase  Neg  (Negative)   11/08/19  16:30    


 


Urine WBC (Auto)  4.0 /HPF (0.0-6.0)   11/08/19  16:30    


 


Urine RBC (Auto)  1.0 /HPF (0.0-6.0)   11/08/19  16:30    


 


U Epithel Cells (Auto)  1.0 /HPF (0-13.0)   11/08/19  16:30    


 


Urine Bacteria (Auto)  2+ /HPF (Negative)   11/08/19  16:30    


 


Hyaline Casts  3 /LPF  11/08/19  16:30    


 


Urine Mucus  Few /HPF  11/08/19  16:30    


 


Urine Opiates Screen  Presumptive negative   11/08/19  16:30    


 


Urine Methadone Screen  Presumptive negative   11/08/19  16:30    


 


Ur Barbiturates Screen  Presumptive negative   11/08/19  16:30    


 


Ur Phencyclidine Scrn  Presumptive positive   11/08/19  16:30    


 


Ur Amphetamines Screen  Presumptive negative   11/08/19  16:30    


 


U Benzodiazepines Scrn  Presumptive positive   11/08/19  16:30    


 


Urine Cocaine Screen  Presumptive negative   11/08/19  16:30    


 


U Marijuana (THC) Screen  Presumptive negative   11/08/19  16:30    


 


Drugs of Abuse Note  Disclamer   11/08/19  16:30    


 


Plasma/Serum Alcohol  < 0.01 % (0-0.07)   11/08/19  18:34    


 


Hepatitis A IgM Ab  Non-reactive  (NonReactive)   11/08/19  20:44    


 


Hep Bs Antigen  Non-reactive  (Negative)   11/08/19  20:44    


 


Hep B Core IgM Ab  Non-reactive  (NonReactive)   11/08/19  20:44    


 


Hepatitis C Antibody  Non-reactive  (NonReactive)   11/08/19  20:44    














Active Medications





- Current Medications


Current Medications: 














Generic Name Dose Route Start Last Admin





  Trade Name Freq  PRN Reason Stop Dose Admin


 


Albuterol  2.5 mg  11/08/19 20:11 





  Proventil  IH  





  Q4HRT PRN  





  Shortness Of Breath  


 


Lipase/Protease/Amylase  1 each  11/08/19 20:05 





  Pancreaze  10,500 Unit  FEEDTUBE  





  PRN PRN  





  For Clogged Feeding Tube  


 


Dextrose  50 ml  11/08/19 20:05 





  D50w (25gm) Syringe  IV  





  Q30MIN PRN  





  Hypoglycemia  





  Protocol  


 


Enoxaparin Sodium  40 mg  11/08/19 21:00  11/08/19 23:40





  Enoxaparin  SUB-Q   40 mg





  QDAY@2200 CAM   Administration


 


Famotidine  20 mg  11/08/19 22:00  11/08/19 23:39





  Pepcid  IV   20 mg





  BID CAM   Administration


 


Fentanyl  50 mcg  11/09/19 00:38 





  Sublimaze  IV  





  Q10MIN PRN  





  ANALGESIA  


 


Hydromorphone HCl  0.5 mg  11/08/19 20:05 





  Dilaudid  IV  





  Q3H PRN  





  Pain , Severe (7-10)  


 


Hydrophilic Ointment  1 applic  11/09/19 00:38 





  Vaseline Lip Therapy  TP  





  Q2HR PRN  





  Dry Lips  


 


Lorazepam 100 mg/ Sodium  100 mls @ 1 mls/hr  11/08/19 16:00  11/08/19 17:52





Chloride/ Miscellaneous  IV   5 mg/hr





Information  TITR CAM   5 mls/hr





    Titration





  Protocol  





  1 MG/HR  


 


Propofol  1,000 mg in 100 mls @ 2.245 mls/hr  11/08/19 19:00  11/09/19 07:18





  Diprivan 10 Mg/Ml  IV   30 mcg/kg/min





  TITR CAM   13.472 mls/hr





    Titration





  Protocol  





  5 MCG/KG/MIN  


 


Dextrose/Sodium Chloride  1,000 mls @ 100 mls/hr  11/08/19 21:00 





  D5ns  IV  





  AS DIRECT CAM  


 


Levetiracetam 750 mg/ Dextrose  107.5 mls @ 400 mls/hr  11/09/19 06:00 





  IV  





  Q12H CAM  


 


Ceftriaxone Sodium  2 gm in 100 mls @ 200 mls/hr  11/08/19 21:00  11/08/19 23:40





  Rocephin/Ns 2 Gm/100 Ml  IV   200 mls/hr





  Q24HR@2200 CAM   Administration





  Protocol  


 


Fentanyl Citrate  2,000 mcg in 100 mls @ 3.742 mls/hr  11/09/19 01:00  11/09/19 

00:55





  Fentanyl Drip Premix  IV   1 mcg/kg/hr





  TITR CAM   3.742 mls/hr





    Administration





  Protocol  





  1 MCG/KG/HR  


 


Lorazepam  2 mg  11/08/19 20:38 





  Ativan  IV  





  Q1H PRN  





  CIWA-Ar 8-15  


 


Lorazepam  4 mg  11/08/19 20:38 





  Ativan  IV  





  Q15MIN PRN  





  CIWA-Ar >25  


 


Metoclopramide HCl  10 mg  11/08/19 20:05 





  Reglan  IV  





  Q6H PRN  





  Nausea And Vomiting  


 


Multi-Ingred Cream/Lotion/Oil/Oint  1 applic  11/09/19 00:38 





  Artificial Tears Ophth Oint  OU  





  Q4HR PRN  





  Dry Eye(s)  


 


Ondansetron HCl  4 mg  11/08/19 20:05 





  Zofran  IV  





  Q3H PRN  





  Nausea And Vomiting  


 


Simple Syrup  15 ml  11/08/19 20:05 





  Simple Syrup  FEEDTUBE  





  PRN PRN  





  Hypoglycemia  


 


Simple Syrup  30 ml  11/08/19 20:05 





  Simple Syrup  FEEDTUBE  





  PRN PRN  





  Hypoglycemia  


 


Sodium Bicarbonate  325 mg  11/08/19 20:05 





  Sodium Bicarbonate  FEEDTUBE  





  PRN PRN  





  For Clogged Feeding Tube  


 


Sodium Chloride  10 ml  11/08/19 22:00  11/08/19 23:39





  Sodium Chloride Flush Syringe 10 Ml  IV   10 ml





  BID CAM   Administration


 


Sodium Chloride  10 ml  11/08/19 20:05 





  Sodium Chloride Flush Syringe 10 Ml  IV  





  PRN PRN  





  LINE FLUSH

## 2019-11-09 NOTE — XRAY REPORT
CHEST 1 VIEW 



INDICATION / CLINICAL INFORMATION:

Verify OG tube placement.



COMPARISON: 

11/8/2019



FINDINGS:



SUPPORT DEVICES: ET tube is present and stable in position. There has been interval placement of NG t
ube. The tip is in the mid to distal portion of the stomach..



HEART / MEDIASTINUM: No significant abnormality. 



LUNGS / PLEURA: There is prominent left lower lobe pleural-parenchymal disease. Right lung is well ex
panded and clear. No interstitial pulmonary edema. No pneumothorax. 



ADDITIONAL FINDINGS: No significant additional findings.



IMPRESSION:

1. ET tube and NG tube appear to be in satisfactory position.

2. Prominent left lower lobe pleural-parenchymal disease.



Signer Name: Donna Arboleda MD 

Signed: 11/9/2019 5:49 AM

 Workstation Name: [id number]

## 2019-11-09 NOTE — CONSULTATION
History of Present Illness


Consult date: 11/09/19


Requesting physician: FLEX ARAIZA





Past History


Past Medical History: No medical history


Past Surgical History: No surgical history


Social history: lives with family, other (PCP abuse and benzo abuse)


Family history: no significant family history





Medications and Allergies


                                    Allergies











Allergy/AdvReac Type Severity Reaction Status Date / Time


 


No Known Allergies Allergy   Unverified 11/08/19 17:15











                                Home Medications











 Medication  Instructions  Recorded  Confirmed  Last Taken  Type


 


No Known Home Medications [No  11/09/19 11/09/19 Unknown History





Reported Home Medications]     











Active Meds: 


Active Medications





Albuterol (Proventil)  2.5 mg IH Q4HRT PRN


   PRN Reason: Shortness Of Breath


Lipase/Protease/Amylase (Pancreaze Dr 10,500 Unit)  1 each FEEDTUBE PRN PRN


   PRN Reason: For Clogged Feeding Tube


Dextrose (D50w (25gm) Syringe)  50 ml IV Q30MIN PRN; Protocol


   PRN Reason: Hypoglycemia


Enoxaparin Sodium (Enoxaparin)  40 mg SUB-Q QDAY@2200 [facility]


   Last Admin: 11/08/19 23:40 Dose:  40 mg


   Documented by: 


Famotidine (Pepcid)  20 mg IV BID [facility]


   Last Admin: 11/09/19 11:44 Dose:  20 mg


   Documented by: 


Fentanyl (Sublimaze)  50 mcg IV Q10MIN PRN


   PRN Reason: ANALGESIA


Hydromorphone HCl (Dilaudid)  0.5 mg IV Q3H PRN


   PRN Reason: Pain , Severe (7-10)


Hydrophilic Ointment (Vaseline Lip Therapy)  1 applic TP Q2HR PRN


   PRN Reason: Dry Lips


Lorazepam 100 mg/ Sodium Chloride/ Miscellaneous Information  100 mls @ 1 mls/hr

IV TITR CAM; Protocol


   Last Titration: 11/08/19 17:52 Dose:  5 mg/hr, 5 mls/hr


   Documented by: 


Propofol (Diprivan 10 Mg/Ml)  1,000 mg in 100 mls @ 2.245 mls/hr IV TITR CAM; 

Protocol


   Last Titration: 11/09/19 07:18 Dose:  30 mcg/kg/min, 13.472 mls/hr


   Documented by: 


Dextrose/Sodium Chloride (D5ns)  1,000 mls @ 100 mls/hr IV AS DIRECT CAM


   Last Admin: 11/09/19 08:08 Dose:  100 mls/hr


   Documented by: 


Levetiracetam 750 mg/ Dextrose  107.5 mls @ 400 mls/hr IV Q12H [facility]


   Last Admin: 11/09/19 08:24 Dose:  400 mls/hr


   Documented by: 


Ceftriaxone Sodium (Rocephin/Ns 2 Gm/100 Ml)  2 gm in 100 mls @ 200 mls/hr IV 

Q24HR@2200 CAM; Protocol


   Last Admin: 11/08/19 23:40 Dose:  200 mls/hr


   Documented by: 


Fentanyl Citrate (Fentanyl Drip Premix)  2,000 mcg in 100 mls @ 3.742 mls/hr IV 

TITR [facility]; Protocol


   Last Admin: 11/09/19 00:55 Dose:  1 mcg/kg/hr, 3.742 mls/hr


   Documented by: 


Lorazepam (Ativan)  2 mg IV Q1H PRN


   PRN Reason: CIWA-Ar 8-15


Lorazepam (Ativan)  4 mg IV Q15MIN PRN


   PRN Reason: CIWA-Ar >25


Metoclopramide HCl (Reglan)  10 mg IV Q6H PRN


   PRN Reason: Nausea And Vomiting


Multi-Ingred Cream/Lotion/Oil/Oint (Artificial Tears Ophth Oint)  1 applic OU 

Q4HR PRN


   PRN Reason: Dry Eye(s)


Ondansetron HCl (Zofran)  4 mg IV Q3H PRN


   PRN Reason: Nausea And Vomiting


Simple Syrup (Simple Syrup)  15 ml FEEDTUBE PRN PRN


   PRN Reason: Hypoglycemia


Simple Syrup (Simple Syrup)  30 ml FEEDTUBE PRN PRN


   PRN Reason: Hypoglycemia


Sodium Bicarbonate (Sodium Bicarbonate)  325 mg FEEDTUBE PRN PRN


   PRN Reason: For Clogged Feeding Tube


Sodium Chloride (Sodium Chloride Flush Syringe 10 Ml)  10 ml IV BID [facility]


   Last Admin: 11/09/19 11:44 Dose:  10 ml


   Documented by: 


Sodium Chloride (Sodium Chloride Flush Syringe 10 Ml)  10 ml IV PRN PRN


   PRN Reason: LINE FLUSH











Physical Examination


Vital signs: 


                                   Vital Signs











Resp Pulse Ox


 


 15   95 


 


 11/08/19 15:11  11/08/19 15:11














Results





- Laboratory Findings


CBC and BMP: 


                                 11/08/19 15:30





                                 11/08/19 17:00


ABG











POC ABG pH  7.300  (7.35-7.45)  L  11/08/19  22:12    


 


ABG pH  7.296 pH Units (7.350-7.450)  L  11/09/19  04:40    


 


POC ABG pCO2  36.6  (35-45)   11/08/19  22:12    


 


ABG pCO2  36.5 mm Hg  11/09/19  04:40    


 


POC ABG pO2  126  ()  H  11/08/19  22:12    


 


ABG pO2  89.8 mm Hg (80.0-90.0)   11/09/19  04:40    


 


POC ABG HCO3  18.0  (22-26 mml/L)   11/08/19  22:12    


 


POC ABG Total CO2  19  (23-27mmol/L)   11/08/19  22:12    


 


POC ABG O2 Sat  98   11/08/19  22:12    


 


ABG O2 Saturation  96.8 % (95.0-99.0)   11/09/19  04:40    








Abnormal lab findings: 


                                  Abnormal Labs











  11/08/19 11/08/19 11/08/19





  15:30 17:00 18:03


 


WBC  12.4 H  


 


RBC  5.07 H  


 


Hgb  15.3 H  


 


RDW  13.1 L  


 


Lymph % (Auto)  9.0 L  


 


Mono % (Auto)  8.1 H  


 


Lymph #  1.1 L  


 


Mono #  1.0 H  


 


Seg Neutrophils %  82.3 H  


 


Seg Neutrophils #  10.2 H  


 


POC ABG pH    7.265 L


 


ABG pH   


 


POC ABG pO2    212 H


 


ABG HCO3   


 


ABG Base Excess   


 


ABG Hemoglobin   


 


Chloride   110.0 H 


 


Carbon Dioxide   17 L 


 


Glucose   111 H 


 


Calcium   8.2 L 


 


Phosphorus   


 


AST   367 H 


 


ALT   92 H 














  11/08/19 11/08/19 11/09/19





  20:44 22:12 04:40


 


WBC   


 


RBC   


 


Hgb   


 


RDW   


 


Lymph % (Auto)   


 


Mono % (Auto)   


 


Lymph #   


 


Mono #   


 


Seg Neutrophils %   


 


Seg Neutrophils #   


 


POC ABG pH   7.300 L 


 


ABG pH    7.296 L


 


POC ABG pO2   126 H 


 


ABG HCO3    17.4 L


 


ABG Base Excess    -8.3 L


 


ABG Hemoglobin    13.6 L


 


Chloride   


 


Carbon Dioxide   


 


Glucose   


 


Calcium  7.7 L  


 


Phosphorus  4.70 H  


 


AST   


 


ALT   














Assessment and Plan





41 y/o male found unresponsive and now on sedation for possible seizure.





1.  Stop all sedation


2.  CXR is stable


3.  ABG shows metabolic acidosis but normal renal function, normal lactic acid, 

Blood sugar normal, did have positive UDS for phenyclidines (PCP cyril dust).  

could explain the seizure and the acidosis.  Chloride was also elevated as well.


4.  If patient wakes up and can follow commands, will extubate, no matter what 

time.





CCT 31 minutes.

## 2019-11-09 NOTE — XRAY REPORT
ABDOMEN 1 VIEW(S)



INDICATION / CLINICAL INFORMATION:

Abdominal pain.



COMPARISON: 

None available.



FINDINGS:



TUBES / LINES: NG tube terminates within the mid stomach.

BOWEL GAS PATTERN: No significant abnormality. 

FREE AIR / EXTRALUMINAL GAS: None seen.



ADDITIONAL FINDINGS: No significant additional findings.



IMPRESSION:

1. No significant abnormality.



Signer Name: Jhonatan Marsh MD 

Signed: 11/9/2019 3:41 PM

 Workstation Name: [id number]

## 2019-11-10 RX ADMIN — ENOXAPARIN SODIUM SCH MG: 100 INJECTION SUBCUTANEOUS at 21:37

## 2019-11-10 RX ADMIN — FAMOTIDINE SCH MG: 10 INJECTION, SOLUTION INTRAVENOUS at 21:37

## 2019-11-10 RX ADMIN — LEVETIRACETAM SCH MLS/HR: 100 INJECTION, SOLUTION INTRAVENOUS at 08:00

## 2019-11-10 RX ADMIN — Medication SCH ML: at 10:10

## 2019-11-10 RX ADMIN — FAMOTIDINE SCH MG: 10 INJECTION, SOLUTION INTRAVENOUS at 10:10

## 2019-11-10 RX ADMIN — CEFTRIAXONE SODIUM SCH MLS/HR: 2 INJECTION, POWDER, FOR SOLUTION INTRAMUSCULAR; INTRAVENOUS at 21:37

## 2019-11-10 RX ADMIN — LEVETIRACETAM SCH MLS/HR: 100 INJECTION, SOLUTION INTRAVENOUS at 19:43

## 2019-11-10 NOTE — PROGRESS NOTE
Assessment and Plan


Assessment and plan: 


Patient is a 41 yo  man without known chronic medical problems except 

for PCP and Benzo dependence who presents to [facility] with AMS. He was found 

unresponsive by Mother.  Mother cannot tell how long he was passed out.  Last 

well-known time was last night. Mother called EMS and per EMS patient had a 

tonic-clonic seizure just before arrival in the emergency room and was given 2 

milligrams of Versed.  Patient's UDS positive for PCP and benzos.  Patient was 

intubated in the emergency room.  





* pCXR Impression: Volume loss of left lung with elevated left hemidiaphragm, no

  acute disease, ET tube has tip 8 cm above augusto


* CT head without contrast Impression: Negative 





Acute respiratory failure s/p ETT, he self extubated on 11/09/19


Acute toxic metabolic encephalopathy with PCP and Bzo, still confused


PCP dependence


Metabolic acidosis


Status epilepticus Seizure: Patient initiated on IV Keppra


Transaminitis, most likely ETOH related


 DVT prophylaxis On Lovenox and GI prophylaxis








History


Interval history: 


Patient was seen and examined. Follow-up on current diagnosis of Respiratory 

failure. No overnight events reported to me. Patient intubated. Imaging, nursing

note, chart, labs and old chart reviewed. I spoke with mother and his Uncle Donnie

at bedside. 





Hospitalist Physical





- Physical exam


Narrative exam: 


Gen: WDWN, critically ill on life support/MV


HEENT: NCAT, EOMI, Pupils pinpoint, OP Clear 


Neck: supple, no adenopathy, no thyromegaly, no JVD 


CVS/Heart: RRR, normal S1S2, pulses present bilaterally 


Chest/Lungs: CTA B, Symmetrical chest expansion, good air entry bilaterally 


GI/Abdomen: soft, NTND, good bowel sounds, no guarding or rebound 


/Bladder: no suprapubic tenderness, no CVA or paraspinal tenderness 


Extermity/Skin: no c/c/e,


MSK: sedated


Neuro: sedated


Psych: sedated








- Constitutional


Vitals: 


                                        











Temp Pulse Resp BP Pulse Ox


 


 98.8 F   89   20   122/80   100 


 


 11/10/19 03:29  11/10/19 12:00  11/10/19 12:00  11/10/19 12:00  11/10/19 12:00











General appearance: Present: no acute distress, well-nourished





Results





- Labs


CBC & Chem 7: 


                                 11/08/19 15:30





                                 11/08/19 17:00


Labs: 


                             Laboratory Last Values











WBC  12.4 K/mm3 (4.5-11.0)  H  11/08/19  15:30    


 


RBC  5.07 M/mm3 (3.65-5.03)  H  11/08/19  15:30    


 


Hgb  15.3 gm/dl (11.8-15.2)  H  11/08/19  15:30    


 


Hct  45.1 % (35.5-45.6)   11/08/19  15:30    


 


MCV  89 fl (84-94)   11/08/19  15:30    


 


MCH  30 pg (28-32)   11/08/19  15:30    


 


MCHC  34 % (32-34)   11/08/19  15:30    


 


RDW  13.1 % (13.2-15.2)  L  11/08/19  15:30    


 


Plt Count  215 K/mm3 (140-440)   11/08/19  15:30    


 


Lymph % (Auto)  9.0 % (13.4-35.0)  L  11/08/19  15:30    


 


Mono % (Auto)  8.1 % (0.0-7.3)  H  11/08/19  15:30    


 


Eos % (Auto)  0.1 % (0.0-4.3)   11/08/19  15:30    


 


Baso % (Auto)  0.5 % (0.0-1.8)   11/08/19  15:30    


 


Lymph #  1.1 K/mm3 (1.2-5.4)  L  11/08/19  15:30    


 


Mono #  1.0 K/mm3 (0.0-0.8)  H  11/08/19  15:30    


 


Eos #  0.0 K/mm3 (0.0-0.4)   11/08/19  15:30    


 


Baso #  0.1 K/mm3 (0.0-0.1)   11/08/19  15:30    


 


Seg Neutrophils %  82.3 % (40.0-70.0)  H  11/08/19  15:30    


 


Seg Neutrophils #  10.2 K/mm3 (1.8-7.7)  H  11/08/19  15:30    


 


POC ABG pH  7.300  (7.35-7.45)  L  11/08/19  22:12    


 


ABG pH  7.296 pH Units (7.350-7.450)  L  11/09/19  04:40    


 


POC ABG pCO2  36.6  (35-45)   11/08/19  22:12    


 


ABG pCO2  36.5 mm Hg  11/09/19  04:40    


 


POC ABG pO2  126  ()  H  11/08/19  22:12    


 


ABG pO2  89.8 mm Hg (80.0-90.0)   11/09/19  04:40    


 


POC ABG HCO3  18.0  (22-26 mml/L)   11/08/19  22:12    


 


ABG HCO3  17.4 mmol/L (20.0-26.0)  L  11/09/19  04:40    


 


POC ABG Total CO2  19  (23-27mmol/L)   11/08/19  22:12    


 


POC ABG O2 Sat  98   11/08/19  22:12    


 


ABG O2 Saturation  96.8 % (95.0-99.0)   11/09/19  04:40    


 


ABG O2 Content  18.2  (0.0-44)   11/09/19  04:40    


 


POC ABG Base Excess  -8  ((-2) - (+3)mmol/L)   11/08/19  22:12    


 


ABG Base Excess  -8.3 mmol/L (-2.0-3.0)  L  11/09/19  04:40    


 


ABG Hemoglobin  13.6 gm/dl (14.0-18.0)  L  11/09/19  04:40    


 


ABG Carboxyhemoglobin  1.4 % (0.0-5.0)   11/09/19  04:40    


 


ABG Methemoglobin  0.4 % (0.0-1.5)   11/09/19  04:40    


 


Oxyhemoglobin  95.1 % (95.0-99.0)   11/09/19  04:40    


 


FiO2  25 %  11/09/19  04:40    


 


Sodium  144 mmol/L (137-145)   11/08/19  17:00    


 


Potassium  4.0 mmol/L (3.6-5.0)   11/08/19  17:00    


 


Chloride  110.0 mmol/L ()  H  11/08/19  17:00    


 


Carbon Dioxide  17 mmol/L (22-30)  L  11/08/19  17:00    


 


Anion Gap  21 mmol/L  11/08/19  17:00    


 


BUN  16 mg/dL (9-20)   11/08/19  17:00    


 


Creatinine  1.3 mg/dL (0.8-1.5)   11/08/19  17:00    


 


Estimated GFR  > 60 ml/min  11/08/19  17:00    


 


BUN/Creatinine Ratio  12 %  11/08/19  17:00    


 


Glucose  111 mg/dL ()  H  11/08/19  17:00    


 


Lactic Acid  0.70 mmol/L (0.7-2.0)   11/08/19  17:00    


 


Calcium  7.7 mg/dL (8.4-10.2)  L  11/08/19  20:44    


 


Phosphorus  4.70 mg/dL (2.5-4.5)  H  11/08/19  20:44    


 


Magnesium  2.30 mg/dL (1.7-2.3)   11/08/19  20:44    


 


Total Bilirubin  0.70 mg/dL (0.1-1.2)   11/08/19  17:00    


 


AST  367 units/L (5-40)  H  11/08/19  17:00    


 


ALT  92 units/L (7-56)  H  11/08/19  17:00    


 


Alkaline Phosphatase  55 units/L ()   11/08/19  17:00    


 


Ammonia  55.0 umol/L (25-60)   11/08/19  20:44    


 


Total Protein  6.7 g/dL (6.3-8.2)   11/08/19  17:00    


 


Albumin  4.3 g/dL (3.9-5)   11/08/19  17:00    


 


Albumin/Globulin Ratio  1.8 %  11/08/19  17:00    


 


Urine Color  Yellow  (Yellow)   11/08/19  16:30    


 


Urine Turbidity  Slightly-cloudy  (Clear)   11/08/19  16:30    


 


Urine pH  5.0  (5.0-7.0)   11/08/19  16:30    


 


Ur Specific Gravity  1.017  (1.003-1.030)   11/08/19  16:30    


 


Urine Protein  100 mg/dl mg/dL (Negative)   11/08/19  16:30    


 


Urine Glucose (UA)  Neg mg/dL (Negative)   11/08/19  16:30    


 


Urine Ketones  Tr mg/dL (Negative)   11/08/19  16:30    


 


Urine Blood  Lg  (Negative)   11/08/19  16:30    


 


Urine Nitrite  Neg  (Negative)   11/08/19  16:30    


 


Urine Bilirubin  Neg  (Negative)   11/08/19  16:30    


 


Urine Urobilinogen  < 2.0 mg/dL (<2.0)   11/08/19  16:30    


 


Ur Leukocyte Esterase  Neg  (Negative)   11/08/19  16:30    


 


Urine WBC (Auto)  4.0 /HPF (0.0-6.0)   11/08/19  16:30    


 


Urine RBC (Auto)  1.0 /HPF (0.0-6.0)   11/08/19  16:30    


 


U Epithel Cells (Auto)  1.0 /HPF (0-13.0)   11/08/19  16:30    


 


Urine Bacteria (Auto)  2+ /HPF (Negative)   11/08/19  16:30    


 


Hyaline Casts  3 /LPF  11/08/19  16:30    


 


Urine Mucus  Few /HPF  11/08/19  16:30    


 


Urine Opiates Screen  Presumptive negative   11/08/19  16:30    


 


Urine Methadone Screen  Presumptive negative   11/08/19  16:30    


 


Ur Barbiturates Screen  Presumptive negative   11/08/19  16:30    


 


Ur Phencyclidine Scrn  Presumptive positive   11/08/19  16:30    


 


Ur Amphetamines Screen  Presumptive negative   11/08/19  16:30    


 


U Benzodiazepines Scrn  Presumptive positive   11/08/19  16:30    


 


Urine Cocaine Screen  Presumptive negative   11/08/19  16:30    


 


U Marijuana (THC) Screen  Presumptive negative   11/08/19  16:30    


 


Drugs of Abuse Note  Disclamer   11/08/19  16:30    


 


Plasma/Serum Alcohol  < 0.01 % (0-0.07)   11/08/19  18:34    


 


Hepatitis A IgM Ab  Non-reactive  (NonReactive)   11/08/19  20:44    


 


Hep Bs Antigen  Non-reactive  (Negative)   11/08/19  20:44    


 


Hep B Core IgM Ab  Non-reactive  (NonReactive)   11/08/19  20:44    


 


Hepatitis C Antibody  Non-reactive  (NonReactive)   11/08/19  20:44    














Active Medications





- Current Medications


Current Medications: 














Generic Name Dose Route Start Last Admin





  Trade Name Freq  PRN Reason Stop Dose Admin


 


Albuterol  2.5 mg  11/08/19 20:11 





  Proventil  IH  





  Q4HRT PRN  





  Shortness Of Breath  


 


Lipase/Protease/Amylase  1 each  11/08/19 20:05 





  Pancremila Peralta 10,500 Unit  FEEDTUBE  





  PRN PRN  





  For Clogged Feeding Tube  


 


Dextrose  50 ml  11/08/19 20:05 





  D50w (25gm) Syringe  IV  





  Q30MIN PRN  





  Hypoglycemia  





  Protocol  


 


Enoxaparin Sodium  40 mg  11/08/19 21:00  11/09/19 22:00





  Enoxaparin  SUB-Q   40 mg





  QDAY@2200 CAM   Administration


 


Famotidine  20 mg  11/08/19 22:00  11/10/19 10:10





  Pepcid  IV   20 mg





  BID CAM   Administration


 


Hydrophilic Ointment  1 applic  11/09/19 00:38 





  Vaseline Lip Therapy  TP  





  Q2HR PRN  





  Dry Lips  


 


Dextrose/Sodium Chloride  1,000 mls @ 100 mls/hr  11/08/19 21:00  11/09/19 18:35





  D5ns  IV   100 mls/hr





  AS DIRECT CAM   Administration


 


Levetiracetam 750 mg/ Dextrose  107.5 mls @ 400 mls/hr  11/09/19 06:00  11/10/19

08:00





  IV   400 mls/hr





  Q12H CAM   Administration


 


Ceftriaxone Sodium  2 gm in 100 mls @ 200 mls/hr  11/08/19 21:00  11/09/19 22:00





  Rocephin/Ns 2 Gm/100 Ml  IV   200 mls/hr





  Q24HR@2200 CAM   Administration





  Protocol  


 


Metoclopramide HCl  10 mg  11/08/19 20:05 





  Reglan  IV  





  Q6H PRN  





  Nausea And Vomiting  


 


Multi-Ingred Cream/Lotion/Oil/Oint  1 applic  11/09/19 00:38 





  Artificial Tears Ophth Oint  OU  





  Q4HR PRN  





  Dry Eye(s)  


 


Ondansetron HCl  4 mg  11/08/19 20:05 





  Zofran  IV  





  Q3H PRN  





  Nausea And Vomiting  


 


Simple Syrup  15 ml  11/08/19 20:05 





  Simple Syrup  FEEDTUBE  





  PRN PRN  





  Hypoglycemia  


 


Simple Syrup  30 ml  11/08/19 20:05 





  Simple Syrup  FEEDTUBE  





  PRN PRN  





  Hypoglycemia  


 


Sodium Bicarbonate  325 mg  11/08/19 20:05 





  Sodium Bicarbonate  FEEDTUBE  





  PRN PRN  





  For Clogged Feeding Tube  


 


Sodium Chloride  10 ml  11/08/19 22:00  11/10/19 10:10





  Sodium Chloride Flush Syringe 10 Ml  IV   10 ml





  BID CAM   Administration


 


Sodium Chloride  10 ml  11/08/19 20:05 





  Sodium Chloride Flush Syringe 10 Ml  IV  





  PRN PRN  





  LINE FLUSH  














Nutrition/Malnutrition Assess





- Dietary Evaluation


Nutrition/Malnutrition Findings: 


                                        





Nutrition Notes                                            Start:  11/09/19 

14:43


Freq:                                                      Status: Active       




Protocol:                                                                       




 Document     11/09/19 14:43  RM  (Rec: 11/09/19 14:56  RM  [id number])


 Nutrition Notes


     Need for Assessment generated from:         MD Order


     Initial or Follow up                        Assessment


     Other Pertinent Diagnosis                   Acute encephalopathy, Seizure,


                                                 PCP dependence


     Current Diet                                TF (blank)


     Labs/Tests                                  


     Pertinent Medications                       Propofol at 13.5 ml/hr


     Height                                      5 ft 9 in


     Weight                                      74.843 kg


     Ideal Body Weight (kg)                      72.72


     BMI                                         24.3


     Subjective/Other Information                Consulted for TF


                                                 recommendation.


                                                 Pt on vent. Per nurse pt has


                                                 OG tube in place.


     Burn                                        Absent


     Trauma                                      Absent


     Minimum of two criteria                     No


     #1


      Nutrition Diagnosis                        Inadequate oral intake


      Etiology                                   on vent


      As Evidenced by Signs and Symptoms         TF consult


     Is patient on ventilator?                   Yes


     Is Patient Ambulatory and/or Out of Bed     No


     REE-([facility]-confined to bed)      [phone]


     Calculation Used for Recommendations        [facility]


     Additional Notes                            Protein Needs: 90-150g (1.2-2g


                                                 /kg)


                                                 Fluid Needs: 1 ml/kcal


 Nutrition Intervention


     Nutrition Support:                          Promote at 80 ml/hr


                                                 Water flush of 50 mls q 4 hrs


     Kcal                                        1,920


     Protein (gm)                                120


     Fluid (mL)                                  1,611


     Goal #1                                     TF tolerance


     Goal #2                                     Meet at least 80% of calorie


                                                 and protein needs via TF


     Anticipated Discharge Needs:                Unable to determine at this


                                                 time


     Follow-Up By:                               11/11/19


     Additional Comments                         Follow for new TF

## 2019-11-10 NOTE — XRAY REPORT
CHEST 1 VIEW 



INDICATION / CLINICAL INFORMATION:

follow up respiratory failure.



COMPARISON: 

11/9/2019



FINDINGS:



SUPPORT DEVICES: None.



HEART / MEDIASTINUM: No significant abnormality. 



LUNGS / PLEURA: There is airspace disease noted throughout the left lower lobe. This is much less con
solidated compared with the most recent chest radiograph. No significant pleural effusion. Right lung
 is clear. No pneumothorax. 



ADDITIONAL FINDINGS: No significant additional findings.



IMPRESSION:

1. Improving left lower lobe airspace disease.



Signer Name: Donna Arboleda MD 

Signed: 11/10/2019 4:16 AM

 Workstation Name: [id number]

## 2019-11-11 RX ADMIN — CEFTRIAXONE SODIUM SCH MLS/HR: 2 INJECTION, POWDER, FOR SOLUTION INTRAMUSCULAR; INTRAVENOUS at 21:38

## 2019-11-11 RX ADMIN — FAMOTIDINE SCH MG: 20 TABLET ORAL at 21:27

## 2019-11-11 RX ADMIN — LEVETIRACETAM SCH MLS/HR: 100 INJECTION, SOLUTION INTRAVENOUS at 17:18

## 2019-11-11 RX ADMIN — FAMOTIDINE SCH MG: 10 INJECTION, SOLUTION INTRAVENOUS at 09:47

## 2019-11-11 RX ADMIN — LEVETIRACETAM SCH MLS/HR: 100 INJECTION, SOLUTION INTRAVENOUS at 06:56

## 2019-11-11 RX ADMIN — ENOXAPARIN SODIUM SCH MG: 100 INJECTION SUBCUTANEOUS at 21:35

## 2019-11-11 NOTE — CONSULTATION
Past History


Past Medical History: No medical history


Past Surgical History: No surgical history


Social history: lives with family, other (PCP abuse and benzo abuse)


Family history: no significant family history





Medications and Allergies


                                    Allergies











Allergy/AdvReac Type Severity Reaction Status Date / Time


 


No Known Allergies Allergy   Unverified 11/08/19 17:15











                                Home Medications











 Medication  Instructions  Recorded  Confirmed  Last Taken  Type


 


No Known Home Medications [No  11/09/19 11/09/19 Unknown History





Reported Home Medications]     











Active Meds: 


Active Medications





Albuterol (Proventil)  2.5 mg IH Q4HRT PRN


   PRN Reason: Shortness Of Breath


Dextrose (D50w (25gm) Syringe)  50 ml IV Q30MIN PRN; Protocol


   PRN Reason: Hypoglycemia


Enoxaparin Sodium (Enoxaparin)  40 mg SUB-Q QDAY@2200 [facility]


   Last Admin: 11/10/19 21:37 Dose:  40 mg


   Documented by: 


Famotidine (Pepcid)  20 mg PO BID [facility]


Levetiracetam 750 mg/ Dextrose  107.5 mls @ 400 mls/hr IV Q12H [facility]


   Stop: 11/11/19 23:59


   Last Admin: 11/11/19 06:56 Dose:  400 mls/hr


   Documented by: 


Ceftriaxone Sodium (Rocephin/Ns 2 Gm/100 Ml)  2 gm in 100 mls @ 200 mls/hr IV 

Q24HR@2200 [facility]; Protocol


   Last Admin: 11/10/19 21:37 Dose:  200 mls/hr


   Documented by: 


Levetiracetam (Keppra)  750 mg PO BID [facility]


Ondansetron HCl (Zofran)  4 mg IV Q3H PRN


   PRN Reason: Nausea And Vomiting











Physical Examination





- Vital Signs


Vital Signs: 


                                   Vital Signs











Resp Pulse Ox


 


 15   95 


 


 11/08/19 15:11  11/08/19 15:11














Results





- Laboratory Findings


CBC and BMP: 


                                 11/08/19 15:30





                                 11/08/19 17:00


Abnormal Lab Findings: 


                                  Abnormal Labs











  11/08/19 11/08/19 11/08/19





  15:30 17:00 18:03


 


WBC  12.4 H  


 


RBC  5.07 H  


 


Hgb  15.3 H  


 


RDW  13.1 L  


 


Lymph % (Auto)  9.0 L  


 


Mono % (Auto)  8.1 H  


 


Lymph #  1.1 L  


 


Mono #  1.0 H  


 


Seg Neutrophils %  82.3 H  


 


Seg Neutrophils #  10.2 H  


 


POC ABG pH    7.265 L


 


ABG pH   


 


POC ABG pO2    212 H


 


ABG HCO3   


 


ABG Base Excess   


 


ABG Hemoglobin   


 


Chloride   110.0 H 


 


Carbon Dioxide   17 L 


 


Glucose   111 H 


 


Calcium   8.2 L 


 


Phosphorus   


 


AST   367 H 


 


ALT   92 H 














  11/08/19 11/08/19 11/09/19





  20:44 22:12 04:40


 


WBC   


 


RBC   


 


Hgb   


 


RDW   


 


Lymph % (Auto)   


 


Mono % (Auto)   


 


Lymph #   


 


Mono #   


 


Seg Neutrophils %   


 


Seg Neutrophils #   


 


POC ABG pH   7.300 L 


 


ABG pH    7.296 L


 


POC ABG pO2   126 H 


 


ABG HCO3    17.4 L


 


ABG Base Excess    -8.3 L


 


ABG Hemoglobin    13.6 L


 


Chloride   


 


Carbon Dioxide   


 


Glucose   


 


Calcium  7.7 L  


 


Phosphorus  4.70 H  


 


AST   


 


ALT   














Assessment and Plan





44 YEAR OLD MALE WITH HISTORY OF NO SIGNIFICANT MEDICAL PROBLEM AND WITH HISTORY

OF RECREATIONAL DRUG ABUSE 


WHO WAS ARRESTED THREE TIMES FOR DUI WAS ADMITTED ON11/8/2019 FOR BEING FOUND 

UNRESPONSIVE IN THE MORNING OF 11/8/2019.


PATIENT DEVELOPED AN EPISODE OF GTC SEIZURE ON WAY TO HOSPITAL.WORK UP SHOWED 

DRUG SCREEN BEING POSITIVE FOR BENZO AND PCP.


PATIENT ALSO HAD EVIDENCE OF INFECTION WITH ELEVATED WHITE BLOOD CELL COUNT. 

PATIENT WAS ADMITTED TO INTENSIVE CARE UNIT


AND INTUBATED FOR RESPIRATORY SUPPORT.CURRENTLY OFF VENTILATOR AND IS WAITING 

FOR FURTHER WORK UP AS BLOOD WAS FOUND 


IN HIS STOOL.





PHYSICAL EXAMINATION


IN NOACUTE DISTRESS, HE IS ALERT AND APPROPRIATE ,HAS INSIGHT INTO HIS CONDITION

AND ANSWERS QUESTIONS APPROPRIATELY.


HEART-NORMAL RATE AND RHYTHM.


CAROTIDS- BOTH PALPABLE,


CRANIAL NERVES- PUPILS REACT TO LIGHT AND ACCOMMODATION. EXTRA OCULAR MOVEMENT 

IS INTACT, NO FACIAL ASYMMERTY,


OTHER CRANIAL NERVES ARE WITH IN NORMAL LIMIT.


MOTOR- NORMAL STRENGTH IN ALL FOUR EXTREMITIES


REFLEXES- NORMAL WITHOUT ASYMMETRY AND BILATERAL DOWN GOING TOES.


COORDINATION- WITH IN NORMAL LIMIT.


SENSORY- GROSSLY WITH IN NORMAL LIMIT


GAIT- NORMAL 





IMPRESSION


1. ACUTE ENCEPHALOPATHY FROM DRUG OVERDOSE, RESOLVED NOW


2. SEIZURE , MOST LIKLEY PCP INDUCED,


3. ELEVATED WBC- ON ANTIBIOTICS





RECOMMEND.


1. PATIENT SHOULD CONTINUE KEPPRA UP UNTIL HE IS ENROLLED IN A DETOX REHAB 

PROGRAM


2. PATIENT WAS COUNSELED BY ME, BOTH THE MOTHER AND THE PATIEN, BOTH OF THEM


AGREED TO BE ENROLLED IN REHAB PROGRAM


3. SUBSTANCE ABUSE REHAB PROGRAM SHOULD BE CONTACTED AND PATIENT ENROLLED PRIOR 

TO DISCHARGE.

## 2019-11-12 VITALS — DIASTOLIC BLOOD PRESSURE: 82 MMHG | SYSTOLIC BLOOD PRESSURE: 117 MMHG

## 2019-11-12 LAB
ALBUMIN SERPL-MCNC: 3.2 G/DL (ref 3.9–5)
ALT SERPL-CCNC: 99 UNITS/L (ref 7–56)
BUN SERPL-MCNC: 7 MG/DL (ref 9–20)
BUN/CREAT SERPL: 10 %
CALCIUM SERPL-MCNC: 8.4 MG/DL (ref 8.4–10.2)
HCT VFR BLD CALC: 36.8 % (ref 35.5–45.6)
HEMOLYSIS INDEX: 4
HGB BLD-MCNC: 12.5 GM/DL (ref 11.8–15.2)
MCHC RBC AUTO-ENTMCNC: 34 % (ref 32–34)
MCV RBC AUTO: 90 FL (ref 84–94)
PLATELET # BLD: 176 K/MM3 (ref 140–440)
RBC # BLD AUTO: 4.12 M/MM3 (ref 3.65–5.03)

## 2019-11-12 RX ADMIN — FAMOTIDINE SCH MG: 20 TABLET ORAL at 09:56

## 2019-11-12 NOTE — CONSULTATION
History of Present Illness





- Reason for Consult


Consult date: 11/12/19


Reason for consult: Mental Health Evaluation


Requesting physician: HERBERT TESFAYE





- Chief Complaint


Chief complaint: 


"I used PCP"








- History of Present Psychiatric Illness


44 y.o. white male who presented to the ER unresponsive. Today the patient was 

calm and cooperative during the assessment. He stated that he was introduced to 

PCP recently and "probably used to much of it" prior to his ER visit. He stated 

that he has a hx of substance abuse and have used all types of drugs in the 

past. He stated, "Using drugs isn't a good thing." He was adamant that he wasn't

trying to kill himself. He stated that he is willing to follow up "ASAP" with 

rehab services once discharged. He denies being depressed when asked. He denies 

any past suicide attempts. He denies SI/HI's and AVH's. He denies a poor 

appetite and erratic sleep. He denies alcohol consumption (etoh). He stated that

he  buy Xanax on the street "sometimes."   





Per collateral information from the patient's mother Izzy Goldsmith who was at the 

bedside, she stated that her son have a hx of substance abuse. She does not 

believe that her son tried to kill himself prior to his arrival to the ER. She 

is the patient's support system when he is discharged. She stated that she 

support her son going to rehab services. 








Medications and Allergies


                                    Allergies











Allergy/AdvReac Type Severity Reaction Status Date / Time


 


No Known Allergies Allergy   Unverified 11/08/19 17:15











                                Home Medications











 Medication  Instructions  Recorded  Confirmed  Last Taken  Type


 


levETIRAcetam [Keppra TAB] 750 mg PO BID #60 tablet 11/12/19  Unknown Rx











Active Meds: 


Active Medications





Albuterol (Proventil)  2.5 mg IH Q4HRT PRN


   PRN Reason: Shortness Of Breath


Dextrose (D50w (25gm) Syringe)  50 ml IV Q30MIN PRN; Protocol


   PRN Reason: Hypoglycemia


Enoxaparin Sodium (Enoxaparin)  40 mg SUB-Q QDAY@2200 [facility]


   Last Admin: 11/11/19 21:35 Dose:  40 mg


   Documented by: 


Famotidine (Pepcid)  20 mg PO BID [facility]


   Last Admin: 11/12/19 09:56 Dose:  20 mg


   Documented by: 


Ceftriaxone Sodium (Rocephin/Ns 2 Gm/100 Ml)  2 gm in 100 mls @ 200 mls/hr IV 

Q24HR@2200 [facility]; Protocol


   Last Admin: 11/11/19 21:38 Dose:  200 mls/hr


   Documented by: 


Levetiracetam (Keppra)  750 mg PO BID [facility]


   Last Admin: 11/12/19 09:57 Dose:  750 mg


   Documented by: 


Ondansetron HCl (Zofran)  4 mg IV Q3H PRN


   PRN Reason: Nausea And Vomiting











Past psychiatric history





- Past Medical History


Past Medical History: No medical history


Past Surgical History: No surgical history





- past Psychiatric treatment and history


psychiatric treatment history: 


Hx of Substance Abuse. Denies a fam psy hx.








- Social History


Social history: lives with family





Mental Status Exam





- Vital signs


                                Last Vital Signs











Temp  98.1 F   11/12/19 06:38


 


Pulse  64   11/11/19 23:39


 


Resp  18   11/12/19 06:45


 


BP  117/82   11/12/19 06:38


 


Pulse Ox  94   11/12/19 06:45














- Exam


Narrative exam: 


MSE:





 Appearance: calm, cooperative  hospital attire  


 Behavior: regular eye contact 


 Speech: regular rate and tone 


 Mood:: "okay"


 Affect: congruent to mood 


 Thought Process: linera 


 Thought Content: denies SI/HI's and AVH's 


 Motor Activity: ambulatory


 Cognition: A/O x 2


 Insight: fair 


 Judgment: fair  





























Results


Result Diagrams: 


                                 11/12/19 06:28





                                 11/12/19 06:28


                              Abnormal lab results











  11/12/19 11/12/19 Range/Units





  06:28 06:28 


 


RDW  12.6 L   (13.2-15.2)  %


 


Potassium   3.4 L  (3.6-5.0)  mmol/L


 


Chloride   107.1 H  ()  mmol/L


 


BUN   7 L  (9-20)  mg/dL


 


Creatinine   0.7 L  (0.8-1.5)  mg/dL


 


AST   147 H  (5-40)  units/L


 


ALT   99 H  (7-56)  units/L


 


Total Protein   5.8 L  (6.3-8.2)  g/dL


 


Albumin   3.2 L  (3.9-5)  g/dL








All other labs normal.








Assessment and Plan


Assessment and plan: 


Impression: Unintentional Overdose. Substance Use DO (PCP). Today the patient 

was calm and cooperative during the assessment. The patient was positive for 

benzos. 





Recommendation/Plan: The patient does not meet 1013 criteria. Discussed the 

importance to abstain from recreational drug use with the patient, he verbalized

understanding. 





Dispo: The patient can follow up with [facility] or [facility] for outpatient

rehab services.  





Staffed with Dr. MARCUS Dietrich.

## 2019-11-12 NOTE — DISCHARGE SUMMARY
Providers





- Providers


Date of Admission: 


11/08/19 20:05





Attending physician: 


DEJAH SCHMITT MD





                                        





11/08/19 20:07


Consult to Dietitian/Nutrition [CONS] Routine 


   Physician Instructions: Assess nutrtn needs, initiate, modify, manage TF


   Reason For Exam: 


   Reason for Consult: Write/Manage Tube Feeding


   Reason for Consult: Write/Manage Tube Feeding





11/09/19 00:40


Consult to Dietitian/Nutrition [CONS] Routine 


   Physician Instructions: 


   Reason For Exam: 


   Reason for Consult: Evaluate nutritional intake





11/11/19 08:05


Consult to Physician [CONS] Routine 


   Comment: 


   Consulting Provider: JAZMÍN GOMEZ


   Physician Instructions: 


   Reason For Exam: E/M and treat for new onset seizure





11/11/19 11:37


Consult to Mental Health [CONS] Urgent 


   Reason For Exam: 1013 management


   Place consult to:: cns on call


   Notified:: awaiting call back


   Comment:: fax to [phone]











Primary care physician: 


PRIMARY CARE MD








Hospitalization


Reason for admission: overdose


Condition: Critical


Hospital course: 


Patient is a 43 yo  man without known chronic medical problems except 

for PCP and Benzo dependence who presents to [facility] with AMS. He was found 

unresponsive by Mother.  Mother cannot tell how long he was passed out.  Last 

well-known time was last night. Mother called EMS and per EMS patient had a 

tonic-clonic seizure just before arrival in the emergency room and was given 2 

milligrams of Versed.  Patient's UDS positive for PCP and benzos.  Patient was 

intubated in the emergency room.  


Other records includes DUI AND ARRESTED THREE TIMES 





* pCXR Impression: Volume loss of left lung with elevated left hemidiaphragm, no

   acute disease, ET tube has tip 8 cm above augusto


* CT head without contrast Impression: Negative 


* He was intially intubated and admitted to ICU and treated for respiratory 

  failure


* He has been doing well clinically since extubation


* Per documentation it appears the patient "Patient is not on 1013, i was told 

  incorrectly, he denies ever having SI, HI. "


* He was seen by Neurology with recommendation for Keppra and also Detox Program


* Phsych saw and evaluated the patient with the following recommendations


* Patient is Medically stable for discharge and follow [facility] Psych recommendations


* I informed patient his mother will like to speak to me, he will only give 

  permission after his discussion with psych, CM informed. 


* Stongly encouraged patient to consider inpatient detox program





Acute respiratory failure s/p ETT, he self extubated on 11/09/19


Acute toxic metabolic encephalopathy with PCP and Bzo, still confused


PCP dependence


Metabolic acidosis


SIRS, poa, non infectious elevation of WBC on empiric abc, no source of 

infection identified


Status epilepticus Seizure: Patient initiated on IV Keppra


Transaminitis, most likely ETOH related


 D


Disposition: DC-01 TO HOME OR SELFCARE


Time spent for discharge: 35 mins





Core Measure Documentation





- Palliative Care


Palliative Care/ Comfort Measures: Not Applicable





- Core Measures


Any of the following diagnoses?: none





Exam





- Physical Exam


Narrative exam: 


VITAL SIGNS:  Reviewed.    


GENERAL:  The patient appears normally developed, Vital signs as documented.


HEAD:  No signs of head trauma.


EYES:  Pupils are equal.  Extraocular motions intact.  


EARS:  Hearing grossly intact.


MOUTH:  Oropharynx is normal. 


NECK:  No adenopathy, no JVD.  


CHEST:  Chest with clear breath sounds bilaterally.  No wheezes, rales, or 

rhonchi.  


CARDIAC:  Regular rate and rhythm.  S1 and S2, without murmurs, gallops, or 

rubs.


VASCULAR:  No Edema.  Peripheral pulses normal and equal in all extremities.


ABDOMEN:  Soft, non tender and non distended.  No   rebound or guarding, and no 

masses palpated.   Bowel Sounds normal.


MUSCULOSKELETAL:  Good range of motion of all major joints. Extremities without 

clubbing, cyanosis or edema.  


NEUROLOGIC EXAM:  Alert and oriented x 3   No focal sensory or strength 

deficits.   Speech normal.  Follows commands.


PSYCHIATRIC:  Mood normal.


SKIN:  detial exam as documented in skin assessment





- Constitutional


Vitals: 


                                        











Temp Pulse Resp BP Pulse Ox


 


 98.1 F   64   18   117/82   94 


 


 11/12/19 06:38  11/11/19 23:39  11/12/19 06:45  11/12/19 06:38  11/12/19 06:45














Plan


Activity: no driving until cleared by PCP, fall precautions


Diet: low fat


Special Instructions: record daily BP diary, smoking cessation, follow up in 

rehab


Care Plan Goals: 


Recovery from substance abuse


Plan of Treatment: 


Enroll in Detox program


Health Concerns: 


complications of continues substance abuse or seizure due to non compliance


Follow up with: 


[facility] [Outside] - 7 Days


ROBERT SALDAÑA MD [Staff Physician] - 7 Days


PRIMARY CARE,MD [Primary Care Provider] - 7 Days


TERRI TEJADA MD [Staff Physician] - 7 Days

## 2019-11-17 ENCOUNTER — HOSPITAL ENCOUNTER (EMERGENCY)
Dept: HOSPITAL 5 - ED | Age: 44
Discharge: HOME | End: 2019-11-17
Payer: SELF-PAY

## 2019-11-17 VITALS — SYSTOLIC BLOOD PRESSURE: 124 MMHG | DIASTOLIC BLOOD PRESSURE: 75 MMHG

## 2019-11-17 DIAGNOSIS — F16.10: ICD-10-CM

## 2019-11-17 DIAGNOSIS — Z79.899: ICD-10-CM

## 2019-11-17 DIAGNOSIS — R41.82: Primary | ICD-10-CM

## 2019-11-17 DIAGNOSIS — J45.909: ICD-10-CM

## 2019-11-17 LAB
BASOPHILS # (AUTO): 0.1 K/MM3 (ref 0–0.1)
BASOPHILS NFR BLD AUTO: 0.7 % (ref 0–1.8)
BENZODIAZEPINES SCREEN,URINE: (no result)
BILIRUB UR QL STRIP: (no result)
BLOOD UR QL VISUAL: (no result)
BUN SERPL-MCNC: 18 MG/DL (ref 9–20)
BUN/CREAT SERPL: 14 %
CALCIUM SERPL-MCNC: 9.4 MG/DL (ref 8.4–10.2)
EOSINOPHIL # BLD AUTO: 0.2 K/MM3 (ref 0–0.4)
EOSINOPHIL NFR BLD AUTO: 1.8 % (ref 0–4.3)
HCT VFR BLD CALC: 47.6 % (ref 35.5–45.6)
HEMOLYSIS INDEX: 13
HGB BLD-MCNC: 16 GM/DL (ref 11.8–15.2)
HYALINE CASTS #/AREA URNS LPF: 35 /LPF
LYMPHOCYTES # BLD AUTO: 3.3 K/MM3 (ref 1.2–5.4)
LYMPHOCYTES NFR BLD AUTO: 24.8 % (ref 13.4–35)
MCHC RBC AUTO-ENTMCNC: 34 % (ref 32–34)
MCV RBC AUTO: 89 FL (ref 84–94)
METHADONE SCREEN,URINE: (no result)
MONOCYTES # (AUTO): 1.4 K/MM3 (ref 0–0.8)
MONOCYTES % (AUTO): 10.2 % (ref 0–7.3)
MUCOUS THREADS #/AREA URNS HPF: (no result) /HPF
OPIATE SCREEN,URINE: (no result)
PH UR STRIP: 5 [PH] (ref 5–7)
PLATELET # BLD: 335 K/MM3 (ref 140–440)
RBC # BLD AUTO: 5.36 M/MM3 (ref 3.65–5.03)
RBC #/AREA URNS HPF: 7 /HPF (ref 0–6)
UROBILINOGEN UR-MCNC: 2 MG/DL (ref ?–2)
WBC #/AREA URNS HPF: 12 /HPF (ref 0–6)

## 2019-11-17 PROCEDURE — 81001 URINALYSIS AUTO W/SCOPE: CPT

## 2019-11-17 PROCEDURE — 87086 URINE CULTURE/COLONY COUNT: CPT

## 2019-11-17 PROCEDURE — 96361 HYDRATE IV INFUSION ADD-ON: CPT

## 2019-11-17 PROCEDURE — 36415 COLL VENOUS BLD VENIPUNCTURE: CPT

## 2019-11-17 PROCEDURE — 80048 BASIC METABOLIC PNL TOTAL CA: CPT

## 2019-11-17 PROCEDURE — 93010 ELECTROCARDIOGRAM REPORT: CPT

## 2019-11-17 PROCEDURE — 80320 DRUG SCREEN QUANTALCOHOLS: CPT

## 2019-11-17 PROCEDURE — 85025 COMPLETE CBC W/AUTO DIFF WBC: CPT

## 2019-11-17 PROCEDURE — 82962 GLUCOSE BLOOD TEST: CPT

## 2019-11-17 PROCEDURE — 96360 HYDRATION IV INFUSION INIT: CPT

## 2019-11-17 PROCEDURE — 93005 ELECTROCARDIOGRAM TRACING: CPT

## 2019-11-17 PROCEDURE — 71045 X-RAY EXAM CHEST 1 VIEW: CPT

## 2019-11-17 PROCEDURE — 70450 CT HEAD/BRAIN W/O DYE: CPT

## 2019-11-17 PROCEDURE — 80307 DRUG TEST PRSMV CHEM ANLYZR: CPT

## 2019-11-17 PROCEDURE — G0480 DRUG TEST DEF 1-7 CLASSES: HCPCS

## 2019-11-17 NOTE — CAT SCAN REPORT
CT head/brain wo con



INDICATION / CLINICAL INFORMATION:

44 years Male; AMS.



TECHNIQUE: Routine CT head without contrast. All CT scans at this location are performed using CT dos
e reduction for ALARA by means of automated exposure control.



COMPARISON: 

CT scan of the brain from 11/8/2019



FINDINGS:



BRAIN / INTRACRANIAL CONTENTS:  No acute hemorrhage, mass effect, midline shift, hydrocephalus, or ac
margie, large territorial infarct. No chronic infarct or focal atrophy. Normal brain volume and ventricu
lar/sulcal size for age. No significant white matter abnormality. Is suggestion of a millimeter size 
lesion slightly increased CT density seen level of foramen of Toy on the right side. Is nonspecific
. One possibility is incidental nonobstructive colloid cyst. This remains unchanged.



CRANIOCERVICAL JUNCTION: No significant abnormality.



ORBITS: No significant abnormality of visualized orbits.



SINUSES / MASTOIDS: Small retention cyst is seen in the inferior left maxillary sinus.



ADDITIONAL FINDINGS: None. 



IMPRESSION:

 I do not see an acute parenchymal lesion in the brain.



CT findings remain unchanged. 



Signer Name: Jonathan Coleman MD 

Signed: 11/17/2019 1:40 PM

 Workstation Name: [id number]

## 2019-11-17 NOTE — EMERGENCY DEPARTMENT REPORT
ED General Adult HPI





- General


Chief complaint: Altered Mental Status


Stated complaint: AMS/POSS OD ON DRUGS


Time Seen by Provider: 11/17/19 10:45


Source: EMS


Mode of arrival: Stretcher


Limitations: Altered Mental Status





- History of Present Illness


Initial comments: 





Patient presents to the emergency department via EMS with his mother for PCP 

use.  No states the patient was admitted a week and a half ago for changes in 

his mental status for PCP use.  Patient denies melena homicidal for others and 

denies him voices.  Patient states he was just trying to get high.The patient 

was initially unresponsive to her when she arrived but is lucent upon arrival


-: unknown


Severity scale (0 -10): 0


Improves with: none


Worsens with: none


Associated Symptoms: denies other symptoms


Treatments Prior to Arrival: none





- Related Data


                                  Previous Rx's











 Medication  Instructions  Recorded  Last Taken  Type


 


levETIRAcetam [Keppra TAB] 750 mg PO BID #60 tablet 11/12/19 Unknown Rx











                                    Allergies











Allergy/AdvReac Type Severity Reaction Status Date / Time


 


No Known Allergies Allergy   Unverified 11/08/19 17:15














ED Review of Systems


ROS: 


Stated complaint: AMS/POSS OD ON DRUGS


Other details as noted in HPI





Comment: All other systems reviewed and negative


Constitutional: denies: chills, fever


Eyes: denies: eye pain, eye discharge, vision change


ENT: denies: ear pain, throat pain


Respiratory: denies: cough, shortness of breath, wheezing


Cardiovascular: denies: chest pain, palpitations


Endocrine: no symptoms reported


Gastrointestinal: denies: abdominal pain, nausea, diarrhea


Genitourinary: denies: urgency, dysuria


Musculoskeletal: denies: back pain, joint swelling, arthralgia


Skin: denies: rash, lesions


Neurological: denies: headache, weakness, paresthesias


Psychiatric: denies: anxiety, depression


Hematological/Lymphatic: denies: easy bleeding, easy bruising





ED Past Medical Hx





- Past Medical History


Previous Medical History?: Yes


Hx Hypertension: No


Hx CVA: No


Hx Heart Attack/AMI: No


Hx Congestive Heart Failure: No


Hx Diabetes: No


Hx Deep Vein Thrombosis: No


Hx Pulmonary Embolism: No


Hx GERD: No


Hx Liver Disease: No


Hx Renal Disease: No


Hx Sickle Cell Disease: No


Hx Arthritis: No


Hx Headaches / Migraines: No


Hx Seizures: Yes


Hx Kidney Stones: No


Hx Psychiatric Treatment: No


Hx Asthma: Yes (childhood)


Hx COPD: No


Hx Tuberculosis: No


Hx Dementia: No


Hx HIV: No





- Surgical History


Past Surgical History?: No


Hx Coronary Stent: No


Hx Open Heart Surgery: No


Hx Pacemaker: No


Hx Internal Defibrillator: No


Hx Cholecystectomy: No


Hx Appendectomy: No


Hx Breast Surgery: No





- Social History


Smoking Status: Unknown if ever smoked


Substance Use Type: Other





- Medications


Home Medications: 


                                Home Medications











 Medication  Instructions  Recorded  Confirmed  Last Taken  Type


 


levETIRAcetam [Keppra TAB] 750 mg PO BID #60 tablet 11/12/19  Unknown Rx














ED Physical Exam





- General


General appearance: alert, in no apparent distress





- Head


Head exam: Present: atraumatic, normocephalic





- Eye


Eye exam: Present: normal appearance.  Absent: PERRL, EOMI





- ENT


ENT exam: Present: mucous membranes moist





- Neck


Neck exam: Present: normal inspection





- Respiratory


Respiratory exam: Present: normal lung sounds bilaterally.  Absent: respiratory 

distress





- Cardiovascular


Cardiovascular Exam: Present: regular rate, normal rhythm.  Absent: systolic 

murmur, diastolic murmur, rubs, gallop





- GI/Abdominal


GI/Abdominal exam: Present: soft, normal bowel sounds.  Absent: distended, 

tenderness





- Rectal


Rectal exam: Present: deferred





- Extremities Exam


Extremities exam: Present: normal inspection





- Back Exam


Back exam: Present: normal inspection





- Neurological Exam


Neurological exam: Present: alert, oriented X3, CN II-XII intact.  Absent: motor

sensory deficit





- Psychiatric


Psychiatric exam: Present: normal affect, normal mood





- Skin


Skin exam: Present: warm, dry, intact, normal color.  Absent: rash





ED Course


                                   Vital Signs











  11/17/19 11/17/19 11/17/19





  10:30 11:00 12:00


 


Temperature 98.2 F  


 


Pulse Rate 99 H 110 H 106 H


 


Respiratory 19 13 13





Rate   


 


Blood Pressure 115/77 131/81 124/75


 


O2 Sat by Pulse 96  99





Oximetry   














ED Medical Decision Making





- Lab Data


Result diagrams: 


                                 11/17/19 11:07





                                 11/17/19 11:07








                                   Lab Results











  11/17/19 11/17/19 11/17/19 Range/Units





  10:31 11:07 11:07 


 


WBC     (4.5-11.0)  K/mm3


 


RBC     (3.65-5.03)  M/mm3


 


Hgb     (11.8-15.2)  gm/dl


 


Hct     (35.5-45.6)  %


 


MCV     (84-94)  fl


 


MCH     (28-32)  pg


 


MCHC     (32-34)  %


 


RDW     (13.2-15.2)  %


 


Plt Count     (140-440)  K/mm3


 


Lymph % (Auto)     (13.4-35.0)  %


 


Mono % (Auto)     (0.0-7.3)  %


 


Eos % (Auto)     (0.0-4.3)  %


 


Baso % (Auto)     (0.0-1.8)  %


 


Lymph #     (1.2-5.4)  K/mm3


 


Mono #     (0.0-0.8)  K/mm3


 


Eos #     (0.0-0.4)  K/mm3


 


Baso #     (0.0-0.1)  K/mm3


 


Seg Neutrophils %     (40.0-70.0)  %


 


Seg Neutrophils #     (1.8-7.7)  K/mm3


 


Sodium     (137-145)  mmol/L


 


Potassium     (3.6-5.0)  mmol/L


 


Chloride     ()  mmol/L


 


Carbon Dioxide     (22-30)  mmol/L


 


Anion Gap     mmol/L


 


BUN     (9-20)  mg/dL


 


Creatinine     (0.8-1.5)  mg/dL


 


Estimated GFR     ml/min


 


BUN/Creatinine Ratio     %


 


Glucose     ()  mg/dL


 


POC Glucose  103    ()  


 


Calcium     (8.4-10.2)  mg/dL


 


Urine Color     (Yellow)  


 


Urine Turbidity     (Clear)  


 


Urine pH     (5.0-7.0)  


 


Ur Specific Gravity     (1.003-1.030)  


 


Urine Protein     (Negative)  mg/dL


 


Urine Glucose (UA)     (Negative)  mg/dL


 


Urine Ketones     (Negative)  mg/dL


 


Urine Blood     (Negative)  


 


Urine Nitrite     (Negative)  


 


Urine Bilirubin     (Negative)  


 


Urine Urobilinogen     (<2.0)  mg/dL


 


Ur Leukocyte Esterase     (Negative)  


 


Urine WBC (Auto)     (0.0-6.0)  /HPF


 


Urine RBC (Auto)     (0.0-6.0)  /HPF


 


U Epithel Cells (Auto)     (0-13.0)  /HPF


 


Hyaline Casts     /LPF


 


Urine Mucus     /HPF


 


Salicylates   < 0.3 L   (2.8-20.0)  mg/dL


 


Urine Opiates Screen     


 


Urine Methadone Screen     


 


Acetaminophen    < 5.0 L  (10.0-30.0)  ug/mL


 


Ur Barbiturates Screen     


 


Ur Phencyclidine Scrn     


 


Ur Amphetamines Screen     


 


U Benzodiazepines Scrn     


 


Urine Cocaine Screen     


 


U Marijuana (THC) Screen     


 


Drugs of Abuse Note     


 


Plasma/Serum Alcohol     (0-0.07)  %














  11/17/19 11/17/19 11/17/19 Range/Units





  11:07 11:07 11:07 


 


WBC    13.3 H  (4.5-11.0)  K/mm3


 


RBC    5.36 H  (3.65-5.03)  M/mm3


 


Hgb    16.0 H  (11.8-15.2)  gm/dl


 


Hct    47.6 H  (35.5-45.6)  %


 


MCV    89  (84-94)  fl


 


MCH    30  (28-32)  pg


 


MCHC    34  (32-34)  %


 


RDW    13.1 L  (13.2-15.2)  %


 


Plt Count    335  (140-440)  K/mm3


 


Lymph % (Auto)    24.8  (13.4-35.0)  %


 


Mono % (Auto)    10.2 H  (0.0-7.3)  %


 


Eos % (Auto)    1.8  (0.0-4.3)  %


 


Baso % (Auto)    0.7  (0.0-1.8)  %


 


Lymph #    3.3  (1.2-5.4)  K/mm3


 


Mono #    1.4 H  (0.0-0.8)  K/mm3


 


Eos #    0.2  (0.0-0.4)  K/mm3


 


Baso #    0.1  (0.0-0.1)  K/mm3


 


Seg Neutrophils %    62.5  (40.0-70.0)  %


 


Seg Neutrophils #    8.3 H  (1.8-7.7)  K/mm3


 


Sodium  142    (137-145)  mmol/L


 


Potassium  4.1    (3.6-5.0)  mmol/L


 


Chloride  105.8    ()  mmol/L


 


Carbon Dioxide  15 L    (22-30)  mmol/L


 


Anion Gap  25    mmol/L


 


BUN  18    (9-20)  mg/dL


 


Creatinine  1.3    (0.8-1.5)  mg/dL


 


Estimated GFR  > 60    ml/min


 


BUN/Creatinine Ratio  14    %


 


Glucose  122 H    ()  mg/dL


 


POC Glucose     ()  


 


Calcium  9.4    (8.4-10.2)  mg/dL


 


Urine Color     (Yellow)  


 


Urine Turbidity     (Clear)  


 


Urine pH     (5.0-7.0)  


 


Ur Specific Gravity     (1.003-1.030)  


 


Urine Protein     (Negative)  mg/dL


 


Urine Glucose (UA)     (Negative)  mg/dL


 


Urine Ketones     (Negative)  mg/dL


 


Urine Blood     (Negative)  


 


Urine Nitrite     (Negative)  


 


Urine Bilirubin     (Negative)  


 


Urine Urobilinogen     (<2.0)  mg/dL


 


Ur Leukocyte Esterase     (Negative)  


 


Urine WBC (Auto)     (0.0-6.0)  /HPF


 


Urine RBC (Auto)     (0.0-6.0)  /HPF


 


U Epithel Cells (Auto)     (0-13.0)  /HPF


 


Hyaline Casts     /LPF


 


Urine Mucus     /HPF


 


Salicylates     (2.8-20.0)  mg/dL


 


Urine Opiates Screen     


 


Urine Methadone Screen     


 


Acetaminophen     (10.0-30.0)  ug/mL


 


Ur Barbiturates Screen     


 


Ur Phencyclidine Scrn     


 


Ur Amphetamines Screen     


 


U Benzodiazepines Scrn     


 


Urine Cocaine Screen     


 


U Marijuana (THC) Screen     


 


Drugs of Abuse Note     


 


Plasma/Serum Alcohol   < 0.01   (0-0.07)  %














  11/17/19 11/17/19 Range/Units





  11:50 11:50 


 


WBC    (4.5-11.0)  K/mm3


 


RBC    (3.65-5.03)  M/mm3


 


Hgb    (11.8-15.2)  gm/dl


 


Hct    (35.5-45.6)  %


 


MCV    (84-94)  fl


 


MCH    (28-32)  pg


 


MCHC    (32-34)  %


 


RDW    (13.2-15.2)  %


 


Plt Count    (140-440)  K/mm3


 


Lymph % (Auto)    (13.4-35.0)  %


 


Mono % (Auto)    (0.0-7.3)  %


 


Eos % (Auto)    (0.0-4.3)  %


 


Baso % (Auto)    (0.0-1.8)  %


 


Lymph #    (1.2-5.4)  K/mm3


 


Mono #    (0.0-0.8)  K/mm3


 


Eos #    (0.0-0.4)  K/mm3


 


Baso #    (0.0-0.1)  K/mm3


 


Seg Neutrophils %    (40.0-70.0)  %


 


Seg Neutrophils #    (1.8-7.7)  K/mm3


 


Sodium    (137-145)  mmol/L


 


Potassium    (3.6-5.0)  mmol/L


 


Chloride    ()  mmol/L


 


Carbon Dioxide    (22-30)  mmol/L


 


Anion Gap    mmol/L


 


BUN    (9-20)  mg/dL


 


Creatinine    (0.8-1.5)  mg/dL


 


Estimated GFR    ml/min


 


BUN/Creatinine Ratio    %


 


Glucose    ()  mg/dL


 


POC Glucose    ()  


 


Calcium    (8.4-10.2)  mg/dL


 


Urine Color  Anamika   (Yellow)  


 


Urine Turbidity  Cloudy   (Clear)  


 


Urine pH  5.0   (5.0-7.0)  


 


Ur Specific Gravity  1.026   (1.003-1.030)  


 


Urine Protein  100 mg/dl   (Negative)  mg/dL


 


Urine Glucose (UA)  Neg   (Negative)  mg/dL


 


Urine Ketones  20   (Negative)  mg/dL


 


Urine Blood  Neg   (Negative)  


 


Urine Nitrite  Neg   (Negative)  


 


Urine Bilirubin  Neg   (Negative)  


 


Urine Urobilinogen  2.0   (<2.0)  mg/dL


 


Ur Leukocyte Esterase  Neg   (Negative)  


 


Urine WBC (Auto)  12.0 H   (0.0-6.0)  /HPF


 


Urine RBC (Auto)  7.0   (0.0-6.0)  /HPF


 


U Epithel Cells (Auto)  2.0   (0-13.0)  /HPF


 


Hyaline Casts  35   /LPF


 


Urine Mucus  3+   /HPF


 


Salicylates    (2.8-20.0)  mg/dL


 


Urine Opiates Screen   Presumptive negative  


 


Urine Methadone Screen   Presumptive negative  


 


Acetaminophen    (10.0-30.0)  ug/mL


 


Ur Barbiturates Screen   Presumptive negative  


 


Ur Phencyclidine Scrn   Presumptive positive  


 


Ur Amphetamines Screen   Presumptive negative  


 


U Benzodiazepines Scrn   Presumptive negative  


 


Urine Cocaine Screen   Presumptive negative  


 


U Marijuana (THC) Screen   Presumptive negative  


 


Drugs of Abuse Note   Disclamer  


 


Plasma/Serum Alcohol    (0-0.07)  %














- EKG Data


-: EKG Interpreted by Me


EKG shows normal: sinus rhythm


Rate: tachycardia





- Radiology Data


Radiology results: report reviewed





- Medical Decision Making





CT of the head was obtained because the patient was found altered and 

unresponsive initially before we realized the history of the patient being an 

abuser of PCP





At 2:17 PM the patient is completely lucid and can answer all questions 

appropriately and wants to be discharged home


Critical care attestation.: 


If time is entered above; I have spent that time in minutes in the direct care 

of this critically ill patient, excluding procedure time.








ED Disposition


Clinical Impression: 


 Illicit drug use, Altered mental status, PCP (phencyclidine) abuse





Disposition: DC-01 TO HOME OR SELFCARE


Is pt being admited?: No


Does the pt Need Aspirin: No


Condition: Stable


Instructions:  Polysubstance Abuse (ED)


Additional Instructions: 


return if worse


Referrals: 


[facility] [Outside] - 3-5 Days


[facility] [[facility] - 3-5 Days


[facility] [Provider Group] - 3-5 Days


[facility] [Provider Group] - 3-5 Days


Time of Disposition: 14:19

## 2019-11-17 NOTE — XRAY REPORT
CHEST 1 VIEW 11/17/2019 12:24 PM



INDICATION / CLINICAL INFORMATION:

Shortness of breath.



COMPARISON: 

11/10/2019.



FINDINGS:



SUPPORT DEVICES: None.



HEART / MEDIASTINUM: No significant abnormality. 



LUNGS / PLEURA: No significant pulmonary or pleural abnormality. (Left lower lobe airspace disease se
en on previous studies has resolved). No pneumothorax. 



ADDITIONAL FINDINGS: No significant additional findings.



IMPRESSION:

1. No acute findings.



Signer Name: Giovani German MD 

Signed: 11/17/2019 12:49 PM

 Workstation Name: [id number]

## 2019-11-22 ENCOUNTER — HOSPITAL ENCOUNTER (EMERGENCY)
Dept: HOSPITAL 5 - ED | Age: 44
LOS: 1 days | Discharge: HOME | End: 2019-11-23
Payer: SELF-PAY

## 2019-11-22 DIAGNOSIS — J45.909: ICD-10-CM

## 2019-11-22 DIAGNOSIS — T42.4X1A: Primary | ICD-10-CM

## 2019-11-22 DIAGNOSIS — F16.10: ICD-10-CM
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
## 2019-11-22 DIAGNOSIS — Z79.899: ICD-10-CM

## 2019-11-22 LAB
ALBUMIN SERPL-MCNC: 4.4 G/DL (ref 3.9–5)
ALT SERPL-CCNC: 23 UNITS/L (ref 7–56)
APTT BLD: 26.7 SEC. (ref 24.2–36.6)
BUN SERPL-MCNC: 9 MG/DL (ref 9–20)
BUN/CREAT SERPL: 10 %
CALCIUM SERPL-MCNC: 9.4 MG/DL (ref 8.4–10.2)
HCT VFR BLD CALC: 48.1 % (ref 35.5–45.6)
HEMOLYSIS INDEX: 14
HGB BLD-MCNC: 16.1 GM/DL (ref 11.8–15.2)
INR PPP: 1 (ref 0.87–1.13)
MCHC RBC AUTO-ENTMCNC: 33 % (ref 32–34)
MCV RBC AUTO: 91 FL (ref 84–94)
PLATELET # BLD: 226 K/MM3 (ref 140–440)
RBC # BLD AUTO: 5.29 M/MM3 (ref 3.65–5.03)

## 2019-11-22 PROCEDURE — 84443 ASSAY THYROID STIM HORMONE: CPT

## 2019-11-22 PROCEDURE — 81001 URINALYSIS AUTO W/SCOPE: CPT

## 2019-11-22 PROCEDURE — 82962 GLUCOSE BLOOD TEST: CPT

## 2019-11-22 PROCEDURE — 80320 DRUG SCREEN QUANTALCOHOLS: CPT

## 2019-11-22 PROCEDURE — G0480 DRUG TEST DEF 1-7 CLASSES: HCPCS

## 2019-11-22 PROCEDURE — 93010 ELECTROCARDIOGRAM REPORT: CPT

## 2019-11-22 PROCEDURE — 85610 PROTHROMBIN TIME: CPT

## 2019-11-22 PROCEDURE — 82550 ASSAY OF CK (CPK): CPT

## 2019-11-22 PROCEDURE — 85730 THROMBOPLASTIN TIME PARTIAL: CPT

## 2019-11-22 PROCEDURE — 99285 EMERGENCY DEPT VISIT HI MDM: CPT

## 2019-11-22 PROCEDURE — 82140 ASSAY OF AMMONIA: CPT

## 2019-11-22 PROCEDURE — 80053 COMPREHEN METABOLIC PANEL: CPT

## 2019-11-22 PROCEDURE — 85027 COMPLETE CBC AUTOMATED: CPT

## 2019-11-22 PROCEDURE — 36415 COLL VENOUS BLD VENIPUNCTURE: CPT

## 2019-11-22 PROCEDURE — 70450 CT HEAD/BRAIN W/O DYE: CPT

## 2019-11-22 PROCEDURE — 96365 THER/PROPH/DIAG IV INF INIT: CPT

## 2019-11-22 PROCEDURE — 83735 ASSAY OF MAGNESIUM: CPT

## 2019-11-22 PROCEDURE — 96372 THER/PROPH/DIAG INJ SC/IM: CPT

## 2019-11-22 PROCEDURE — 93005 ELECTROCARDIOGRAM TRACING: CPT

## 2019-11-22 PROCEDURE — 80307 DRUG TEST PRSMV CHEM ANLYZR: CPT

## 2019-11-22 PROCEDURE — 71045 X-RAY EXAM CHEST 1 VIEW: CPT

## 2019-11-22 NOTE — CAT SCAN REPORT
CT HEAD WITHOUT CONTRAST



INDICATION / CLINICAL INFORMATION:

Altered Mental Status.



TECHNIQUE:

All CT scans at this location are performed using CT dose reduction for ALARA by means of automated e
xposure control. 



COMPARISON:

Head CT 11/17/2019 and 11/8/2019.



FINDINGS:

HEMORRHAGE: No evidence of intracranial hemorrhage or extra-axial fluid collection.

EXTRA-AXIAL SPACES: Cortical sulci, sylvian fissures and basilar cisterns have an unremarkable appear
ance.

VENTRICULAR SYSTEM: The ventricular system is of normal size and configuration.

CEREBRAL PARENCHYMA: No areas of abnormal brain parenchymal attenuation are identified. There is no i
ndication of recent infarction. 

MIDLINE SHIFT OR HERNIATION: There is no mass effect.

CEREBELLUM / BRAINSTEM: Brainstem and cerebellum have an unremarkable appearance.

INTRACRANIAL VESSELS:No abnormalities are identified on this noncontrast head CT.

ORBITS: visualized portions of the orbits have an unremarkable appearance.

SOFT TISSUES of HEAD: No significant abnormality.

CALVARIUM: Evaluation of bone windows reveals no abnormalities.

PARANASAL SINUSES / MASTOID AIR CELLS: Note is made of leftward deviation of the nasal septum. Decrea
sed caliber of the air passageways the nasal cavity is observed bilaterally. Paranasal sinuses are fr
ee from inflammatory mucosal disease. Mastoid air cells are normally pneumatized.



IMPRESSION:

1. No acute intracranial abnormality. No interval change.



Signer Name: Rusty Ross MD 

Signed: 11/22/2019 7:11 PM

 Workstation Name: [id number]

## 2019-11-22 NOTE — XRAY REPORT
CHEST 1 VIEW 11/22/2019 3:48 PM



INDICATION / CLINICAL INFORMATION:

MAIN: Altered Mental Status per ems pt was found by parent unconscious, per parent pt is known for ov
erdose unknown last well time. pt was given 2mg of narcan .



COMPARISON: 

Chest x-ray 11/17/2019



FINDINGS:



SUPPORT DEVICES: None.



HEART / MEDIASTINUM: No significant abnormality. 



LUNGS / PLEURA: No significant pulmonary or pleural abnormality. No pneumothorax. 



ADDITIONAL FINDINGS: No significant additional findings.



IMPRESSION:

1. No acute findings.



Signer Name: Krishan Moss MD 

Signed: 11/22/2019 4:05 PM

 Workstation Name: [id number]

## 2019-11-22 NOTE — EMERGENCY DEPARTMENT REPORT
<ARUN BUSH - Last Filed: 11/22/19 23:59>





ED General Adult HPI





- General


Chief complaint: Medical Clearance


Stated complaint: OVERDOSE


Time Seen by Provider: 11/22/19 15:21


Source: EMS (verbal report received from EMS. EMS documentation not available at

time of chart dictation ), RN notes reviewed, old records reviewed


Mode of arrival: Stretcher


Limitations: Altered Mental Status





- History of Present Illness


Initial comments: 





This is a 44-year-old gentleman.  This patient is not known to this provider 

previously.  The patient was recently admitted to this hospital for altered 

mental status secondary to polysubstance abuse.  Apparently, he uses PCP and 

benzodiazepines.  Also found to have transaminitis likely secondary to alcohol 

use, was recently seen by neurology and inpatient consultation, who recommended 

empiric keppra.





Today, the patient is brought to the hospital by emergency medical services for 

altered mental status.  Apparently he was found in bed, minimally responsive.  

There is a suspicion of polysubstance use.  Emergency medical services gave the 

patient Narcan, which improved his mental status in the field.  They report 

normal Accu-Chek in the field.  In the emergency room, the patient is awake, but

confused, moving 4 extremities, does not follow commands, does not respond to 

verbal de-escalation techniques or show of force.  He does not have decision-

making capacity at this time and is Clinically intoxicated.





Therefore, he needs to be evaluated for potentially emergent conditions, such as

intracranial bleed, and toxic metabolic ingestions.  He required medication with

haloperidol and Ativan, as well as physical restraints for his safety and staff 

safety.


-: unknown


Radiation: other


Quality: other


Consistency: other


Improves with: other


Worsens with: other


Associated Symptoms: other





- Related Data


                                  Previous Rx's











 Medication  Instructions  Recorded  Last Taken  Type


 


levETIRAcetam [Keppra TAB] 750 mg PO BID #60 tablet 11/22/19 Unknown Rx











                                    Allergies











Allergy/AdvReac Type Severity Reaction Status Date / Time


 


No Known Allergies Allergy   Unverified 11/08/19 17:15














ED Review of Systems


Comment: Unobtainable due to pts medical conditions





ED Past Medical Hx





- Past Medical History


Hx Hypertension: No


Hx CVA: No


Hx Heart Attack/AMI: No


Hx Congestive Heart Failure: No


Hx Diabetes: No


Hx Deep Vein Thrombosis: No


Hx Pulmonary Embolism: No


Hx GERD: No


Hx Liver Disease: No


Hx Renal Disease: No


Hx Sickle Cell Disease: No


Hx Arthritis: No


Hx Headaches / Migraines: No


Hx Seizures: Yes


Hx Kidney Stones: No


Hx Psychiatric Treatment: No


Hx Asthma: Yes (childhood)


Hx COPD: No


Hx Tuberculosis: No


Hx Dementia: No


Hx HIV: No





- Surgical History


Hx Coronary Stent: No


Hx Open Heart Surgery: No


Hx Pacemaker: No


Hx Internal Defibrillator: No


Hx Cholecystectomy: No


Hx Appendectomy: No


Hx Breast Surgery: No





- Social History


Substance Use Type: Other





- Medications


Home Medications: 


                                Home Medications











 Medication  Instructions  Recorded  Confirmed  Last Taken  Type


 


levETIRAcetam [Keppra TAB] 750 mg PO BID #60 tablet 11/22/19  Unknown Rx














ED Physical Exam





- General


Limitations: Altered Mental Status


General appearance: alert, appears intoxicated, anxious





- Head


Head exam: Present: atraumatic, normocephalic





- Eye


Eye exam: Present: normal appearance, PERRL





- ENT


ENT exam: Present: mucous membranes dry, normal external ear exam, other (nonspe

cific jerking movements noted with eyes, they're intermittent, and they 

fatigue.)





- Neck


Neck exam: Present: normal inspection.  Absent: tenderness, meningismus





- Respiratory


Respiratory exam: Present: normal lung sounds bilaterally.  Absent: respiratory 

distress





- Cardiovascular


Cardiovascular Exam: Present: regular rate, normal rhythm, normal heart sounds. 

Absent: bradycardia, tachycardia, irregular rhythm, systolic murmur, diastolic 

murmur, rubs, gallop





- GI/Abdominal


GI/Abdominal exam: Present: soft.  Absent: distended, tenderness, guarding, 

rebound, rigid, pulsatile mass





- Rectal


Rectal exam: Present: normal inspection





- Extremities Exam


Extremities exam: Present: normal inspection, full ROM, other (2+ pulses noted 

in the bilateral upper, lower extremities.  There is no long bone tenderness.  

Musculoskeletal compartments are soft.  The pelvis is stable.).  Absent: pedal 

edema, joint swelling, calf tenderness





- Back Exam


Back exam: Present: normal inspection, full ROM.  Absent: tenderness, CVA 

tenderness (R), CVA tenderness (L), paraspinal tenderness, vertebral tenderness





- Neurological Exam


Neurological exam: Present: altered, other (there is no facial droop.  The 

tongue is midline.  Extraocular movements appear to be intact.  The patient is 

altered.  He does not speak.  He is moving 4 extremities spontaneously.)





- Psychiatric


Psychiatric exam: Present: agitated





- Skin


Skin exam: Present: warm, dry, intact, normal color.  Absent: rash





ED Course





- Reevaluation(s)


Reevaluation #1: 





11/22/19 16:11


Differential diagnosis, including not limited to: Toxic metabolic 

encephalopathy, intracranial hemorrhage, polysubstance intoxication





Assessment and plan: 44-year-old gentleman who presents with probable overdose 

of uncertain intent, likely recreational, has had multiple repeat evaluations at

this hospital for similar presentations.  He is placed on a hold, screening 

laboratory studies requested, x-ray the chest unremarkable, noncontrast CT scan 

of the brain is pending, there is no history of trauma, recent urinalyses are 

reviewed and appreciated.  Patient recently worked up for systemic inflammatory 

response syndrome, at the current point in time, we do not suspect invasive 

bacterial illness.


Reevaluation #2: 





11/22/19 23:18


Patient resting comfortably and in no acute distress.  He is still intoxicated 

and altered.





We will observe the patient in this department pending clinical sobriety.


Reevaluation #3: 





11/23/19 00:00


care will be transferred to Dr DEYSI James to dispo once clinically sober





ED Medical Decision Making





- Lab Data


Result diagrams: 


                                 11/22/19 15:38





                                 11/22/19 15:38








                                   Vital Signs











  11/22/19





  15:27


 


Temperature 97.2 F L


 


Pulse Rate 87


 


Respiratory 18





Rate 


 


Blood Pressure 158/94


 


O2 Sat by Pulse 97





Oximetry 











                                   Lab Results











  11/22/19 11/22/19 11/22/19 Range/Units





  15:38 15:38 15:38 


 


WBC     (4.5-11.0)  K/mm3


 


RBC     (3.65-5.03)  M/mm3


 


Hgb     (11.8-15.2)  gm/dl


 


Hct     (35.5-45.6)  %


 


MCV     (84-94)  fl


 


MCH     (28-32)  pg


 


MCHC     (32-34)  %


 


RDW     (13.2-15.2)  %


 


Plt Count     (140-440)  K/mm3


 


PT   13.1   (12.2-14.9)  Sec.


 


INR   1.00   (0.87-1.13)  


 


APTT   26.7   (24.2-36.6)  Sec.


 


Sodium    144  (137-145)  mmol/L


 


Potassium    4.4  (3.6-5.0)  mmol/L


 


Chloride    104.2  ()  mmol/L


 


Carbon Dioxide    24  (22-30)  mmol/L


 


Anion Gap    20  mmol/L


 


BUN    9  (9-20)  mg/dL


 


Creatinine    0.9  (0.8-1.5)  mg/dL


 


Estimated GFR    > 60  ml/min


 


BUN/Creatinine Ratio    10  %


 


Glucose    126 H  ()  mg/dL


 


POC Glucose     ()  


 


Calcium    9.4  (8.4-10.2)  mg/dL


 


Magnesium  2.30    (1.7-2.3)  mg/dL


 


Total Bilirubin    0.50  (0.1-1.2)  mg/dL


 


AST    27  (5-40)  units/L


 


ALT    23  (7-56)  units/L


 


Alkaline Phosphatase    55  ()  units/L


 


Ammonia     (25-60)  umol/L


 


Total Creatine Kinase  446 H    ()  units/L


 


Total Protein    7.3  (6.3-8.2)  g/dL


 


Albumin    4.4  (3.9-5)  g/dL


 


Albumin/Globulin Ratio    1.5  %


 


Salicylates     (2.8-20.0)  mg/dL


 


Plasma/Serum Alcohol     (0-0.07)  %














  11/22/19 11/22/19 11/22/19 Range/Units





  15:38 15:38 15:38 


 


WBC     (4.5-11.0)  K/mm3


 


RBC     (3.65-5.03)  M/mm3


 


Hgb     (11.8-15.2)  gm/dl


 


Hct     (35.5-45.6)  %


 


MCV     (84-94)  fl


 


MCH     (28-32)  pg


 


MCHC     (32-34)  %


 


RDW     (13.2-15.2)  %


 


Plt Count     (140-440)  K/mm3


 


PT     (12.2-14.9)  Sec.


 


INR     (0.87-1.13)  


 


APTT     (24.2-36.6)  Sec.


 


Sodium     (137-145)  mmol/L


 


Potassium     (3.6-5.0)  mmol/L


 


Chloride     ()  mmol/L


 


Carbon Dioxide     (22-30)  mmol/L


 


Anion Gap     mmol/L


 


BUN     (9-20)  mg/dL


 


Creatinine     (0.8-1.5)  mg/dL


 


Estimated GFR     ml/min


 


BUN/Creatinine Ratio     %


 


Glucose     ()  mg/dL


 


POC Glucose     ()  


 


Calcium     (8.4-10.2)  mg/dL


 


Magnesium     (1.7-2.3)  mg/dL


 


Total Bilirubin     (0.1-1.2)  mg/dL


 


AST     (5-40)  units/L


 


ALT     (7-56)  units/L


 


Alkaline Phosphatase     ()  units/L


 


Ammonia  32.0    (25-60)  umol/L


 


Total Creatine Kinase     ()  units/L


 


Total Protein     (6.3-8.2)  g/dL


 


Albumin     (3.9-5)  g/dL


 


Albumin/Globulin Ratio     %


 


Salicylates   < 0.3 L   (2.8-20.0)  mg/dL


 


Plasma/Serum Alcohol    < 0.01  (0-0.07)  %














  11/22/19 11/22/19 Range/Units





  15:38 15:48 


 


WBC  9.9   (4.5-11.0)  K/mm3


 


RBC  5.29 H   (3.65-5.03)  M/mm3


 


Hgb  16.1 H   (11.8-15.2)  gm/dl


 


Hct  48.1 H   (35.5-45.6)  %


 


MCV  91   (84-94)  fl


 


MCH  30   (28-32)  pg


 


MCHC  33   (32-34)  %


 


RDW  14.0   (13.2-15.2)  %


 


Plt Count  226   (140-440)  K/mm3


 


PT    (12.2-14.9)  Sec.


 


INR    (0.87-1.13)  


 


APTT    (24.2-36.6)  Sec.


 


Sodium    (137-145)  mmol/L


 


Potassium    (3.6-5.0)  mmol/L


 


Chloride    ()  mmol/L


 


Carbon Dioxide    (22-30)  mmol/L


 


Anion Gap    mmol/L


 


BUN    (9-20)  mg/dL


 


Creatinine    (0.8-1.5)  mg/dL


 


Estimated GFR    ml/min


 


BUN/Creatinine Ratio    %


 


Glucose    ()  mg/dL


 


POC Glucose   93  ()  


 


Calcium    (8.4-10.2)  mg/dL


 


Magnesium    (1.7-2.3)  mg/dL


 


Total Bilirubin    (0.1-1.2)  mg/dL


 


AST    (5-40)  units/L


 


ALT    (7-56)  units/L


 


Alkaline Phosphatase    ()  units/L


 


Ammonia    (25-60)  umol/L


 


Total Creatine Kinase    ()  units/L


 


Total Protein    (6.3-8.2)  g/dL


 


Albumin    (3.9-5)  g/dL


 


Albumin/Globulin Ratio    %


 


Salicylates    (2.8-20.0)  mg/dL


 


Plasma/Serum Alcohol    (0-0.07)  %














- EKG Data


-: EKG Interpreted by Me


EKG shows normal: sinus rhythm


Rate: normal





- EKG Data





11/22/19 16:13


The EKG today shows a sinus rhythm, 83 bpm, normal axis, QTC is 483 ms, there is

atrial enlargement and motion artifact, the EKG appears to be unchanged from 

prior EKG from 11/17/2019.





- Radiology Data


Radiology results: pending, report reviewed, image reviewed


interpreted by me: 





X-ray the chest, interpreted by me, shows no acute disease





ED Disposition


Clinical Impression: 


 PCP (phencyclidine) abuse, Illicit drug use





Disposition: DC-01 TO HOME OR SELFCARE


Does the pt Need Aspirin: No


Condition: Stable


Additional Instructions: 


Do not drive or operate motor vehicles.


Recommend following up with the primary care doctor within the next 7-10 days.  

Take the medication as prescribed.  Recommend patient stop using recreational 

drugs, as recreational drugs may cause disability, death, paralysis, loss of 

quality of life.


Return to emergency room right away with new, worsened or different symptoms, or

symptoms not present on the initial emergency room evaluation.


Prescriptions: 


levETIRAcetam [Keppra TAB] 750 mg PO BID #60 tablet


Referrals: 


[facility] [Provider Group] - 3-5 Days


[facility] PRIMARY [facility]ARE [Provider Group] - 3-5 Days


[facility] [Outside] - 3-5 Days





<CLINT WILSON - Last Filed: 11/23/19 14:55>





ED Review of Systems


ROS: 


Stated complaint: OVERDOSE


Other details as noted in HPI








ED Course


                                   Vital Signs











  11/22/19 11/22/19 11/22/19





  15:18 15:27 15:30


 


Temperature  97.2 F L 


 


Pulse Rate 88 87 83


 


Respiratory 17 18 9 L





Rate   


 


Blood Pressure  158/94 158/94


 


Blood Pressure   





[Left]   


 


O2 Sat by Pulse 97 97 96





Oximetry   














  11/22/19 11/22/19 11/22/19





  15:46 16:00 16:15


 


Temperature   


 


Pulse Rate 113 H 121 H 95 H


 


Respiratory 36 H 18 24





Rate   


 


Blood Pressure 140/94 156/87 133/82


 


Blood Pressure   





[Left]   


 


O2 Sat by Pulse   





Oximetry   














  11/22/19 11/22/19 11/22/19





  16:30 16:45 16:53


 


Temperature   


 


Pulse Rate 88 84 


 


Respiratory 11 L 19 18





Rate   


 


Blood Pressure 133/82 128/78 


 


Blood Pressure   





[Left]   


 


O2 Sat by Pulse   98





Oximetry   














  11/22/19 11/22/19 11/22/19





  17:00 17:15 17:30


 


Temperature   


 


Pulse Rate 88 92 H 93 H


 


Respiratory 20 19 18





Rate   


 


Blood Pressure 128/78 136/96 136/96


 


Blood Pressure   





[Left]   


 


O2 Sat by Pulse   





Oximetry   














  11/22/19 11/22/19 11/22/19





  17:45 18:00 18:16


 


Temperature   


 


Pulse Rate 98 H 93 H 96 H


 


Respiratory 20 20 19





Rate   


 


Blood Pressure 155/97 155/97 144/92


 


Blood Pressure   





[Left]   


 


O2 Sat by Pulse  98 98





Oximetry   














  11/22/19 11/22/19 11/22/19





  18:51 19:00 19:15


 


Temperature   


 


Pulse Rate 94 H 96 H 93 H


 


Respiratory 18 15 16





Rate   


 


Blood Pressure 155/97 140/104 149/97


 


Blood Pressure   





[Left]   


 


O2 Sat by Pulse 98  





Oximetry   














  11/22/19 11/22/19 11/22/19





  19:30 19:46 20:00


 


Temperature   


 


Pulse Rate 94 H 96 H 97 H


 


Respiratory 15 13 20





Rate   


 


Blood Pressure 139/91 139/91 134/97


 


Blood Pressure   





[Left]   


 


O2 Sat by Pulse   





Oximetry   














  11/22/19 11/22/19 11/22/19





  20:16 20:30 20:45


 


Temperature   


 


Pulse Rate 92 H 89 90


 


Respiratory 18 18 18





Rate   


 


Blood Pressure 141/83 121/79 125/87


 


Blood Pressure   





[Left]   


 


O2 Sat by Pulse   





Oximetry   














  11/22/19 11/22/19 11/22/19





  21:00 21:15 21:30


 


Temperature   


 


Pulse Rate 89 89 82


 


Respiratory 16 14 15





Rate   


 


Blood Pressure 122/98 121/86 126/74


 


Blood Pressure   





[Left]   


 


O2 Sat by Pulse   





Oximetry   














  11/22/19 11/22/19 11/22/19





  21:45 21:49 23:00


 


Temperature   


 


Pulse Rate 87 85 80


 


Respiratory 15 17 15





Rate   


 


Blood Pressure 135/87  122/79


 


Blood Pressure  135/87 





[Left]   


 


O2 Sat by Pulse  98 96





Oximetry   














  11/22/19 11/23/19 11/23/19





  23:30 00:00 00:30


 


Temperature   


 


Pulse Rate 81 86 82


 


Respiratory 17 18 16





Rate   


 


Blood Pressure 130/83 132/89 129/84


 


Blood Pressure   





[Left]   


 


O2 Sat by Pulse 95 94 96





Oximetry   














  11/23/19 11/23/19 11/23/19





  01:00 01:23 01:30


 


Temperature   


 


Pulse Rate 84 89 88


 


Respiratory 17 11 L 16





Rate   


 


Blood Pressure 130/86  133/93


 


Blood Pressure  137/91 





[Left]   


 


O2 Sat by Pulse  97 95





Oximetry   














  11/23/19 11/23/19 11/23/19





  02:00 02:15 02:30


 


Temperature   


 


Pulse Rate 81 76 82


 


Respiratory 24 18 18





Rate   


 


Blood Pressure 131/87  118/85


 


Blood Pressure  131/87 





[Left]   


 


O2 Sat by Pulse  97 





Oximetry   














  11/23/19 11/23/19 11/23/19





  03:00 03:30 03:45


 


Temperature   


 


Pulse Rate 79 89 90


 


Respiratory 19 12 17





Rate   


 


Blood Pressure 121/83 132/88 131/81


 


Blood Pressure   





[Left]   


 


O2 Sat by Pulse   





Oximetry   














  11/23/19 11/23/19 11/23/19





  05:22 08:47 11:48


 


Temperature   


 


Pulse Rate 78 76 72


 


Respiratory 17 18 18





Rate   


 


Blood Pressure   


 


Blood Pressure 121/82 122/24 125/81





[Left]   


 


O2 Sat by Pulse 98 98 98





Oximetry   














ED Medical Decision Making





- Lab Data


Result diagrams: 


                                 11/22/19 15:38





                                 11/22/19 15:38





- Medical Decision Making





Patient is 44 years old male signed out to me by my colleague Dr. James.  Patient

 admitted to the ER for altered mental status candidate to polysubstance abuse. 

 Patient found to be positive for PCP.  Patient now is alert, oriented 3 in no 

acute distress.  Patient mother at bedside and she is taking him to a safe house

 as she reported.  Patient is medically and psychiatrically stable for 

discharge.


Critical care attestation.: 


If time is entered above; I have spent that time in minutes in the direct care 

of this critically ill patient, excluding procedure time.








ED Disposition


Is pt being admited?: No

## 2019-11-23 VITALS — DIASTOLIC BLOOD PRESSURE: 81 MMHG | SYSTOLIC BLOOD PRESSURE: 125 MMHG

## 2019-11-23 LAB
BENZODIAZEPINES SCREEN,URINE: (no result)
BILIRUB UR QL STRIP: (no result)
BLOOD UR QL VISUAL: (no result)
METHADONE SCREEN,URINE: (no result)
OPIATE SCREEN,URINE: (no result)
PH UR STRIP: 5 [PH] (ref 5–7)
PROT UR STRIP-MCNC: (no result) MG/DL
RBC #/AREA URNS HPF: 2 /HPF (ref 0–6)
UROBILINOGEN UR-MCNC: < 2 MG/DL (ref ?–2)
WBC #/AREA URNS HPF: 6 /HPF (ref 0–6)